# Patient Record
Sex: FEMALE | Race: BLACK OR AFRICAN AMERICAN | NOT HISPANIC OR LATINO | URBAN - METROPOLITAN AREA
[De-identification: names, ages, dates, MRNs, and addresses within clinical notes are randomized per-mention and may not be internally consistent; named-entity substitution may affect disease eponyms.]

---

## 2017-02-28 ENCOUNTER — EMERGENCY (EMERGENCY)
Facility: HOSPITAL | Age: 22
LOS: 0 days | Discharge: HOME | End: 2017-02-28
Attending: EMERGENCY MEDICINE
Payer: COMMERCIAL

## 2017-02-28 VITALS
OXYGEN SATURATION: 98 % | WEIGHT: 110.01 LBS | RESPIRATION RATE: 17 BRPM | SYSTOLIC BLOOD PRESSURE: 118 MMHG | DIASTOLIC BLOOD PRESSURE: 55 MMHG | TEMPERATURE: 98 F | HEART RATE: 97 BPM | HEIGHT: 64 IN

## 2017-02-28 DIAGNOSIS — R51 HEADACHE: ICD-10-CM

## 2017-02-28 PROCEDURE — 99053 MED SERV 10PM-8AM 24 HR FAC: CPT

## 2017-02-28 PROCEDURE — 70450 CT HEAD/BRAIN W/O DYE: CPT | Mod: 26

## 2017-02-28 PROCEDURE — 99284 EMERGENCY DEPT VISIT MOD MDM: CPT | Mod: 25

## 2017-02-28 RX ORDER — ACETAMINOPHEN 500 MG
650 TABLET ORAL ONCE
Qty: 0 | Refills: 0 | Status: COMPLETED | OUTPATIENT
Start: 2017-02-28 | End: 2017-02-28

## 2017-02-28 RX ADMIN — Medication 650 MILLIGRAM(S): at 04:43

## 2017-02-28 NOTE — ED PROVIDER NOTE - OBJECTIVE STATEMENT
21yoF; with no signif pmh; now p/w headache s/p head trauma. patient states she was in bathroom 2 days ago and sneezed hard and hit head on faucet. c/o headache since then--right frontal head, radiating posteriorly with nausea and photophobia. denies blurry vision/double vision. denies numbness/tingling. denies nausea. denies focal weakness. denies unsteady gait. denies f/c/s. denies neck stiffness.

## 2017-02-28 NOTE — ED ADULT TRIAGE NOTE - CHIEF COMPLAINT QUOTE
Patient reports "migraine headache. I hit my head 2 days ago." + photophobia. Denies noise sensitivity. + nausea, no vomiting. LMP 2 weeks ago. "my head is pounding"

## 2017-02-28 NOTE — ED ADULT NURSE NOTE - CHPI ED SYMPTOMS NEG
no blurred vision/no fever/no numbness/no loss of consciousness/no weakness/no confusion/no change in level of consciousness/no vomiting

## 2019-01-10 ENCOUNTER — EMERGENCY (EMERGENCY)
Facility: HOSPITAL | Age: 24
LOS: 1 days | Discharge: HOME | End: 2019-01-10
Attending: EMERGENCY MEDICINE
Payer: COMMERCIAL

## 2019-01-10 VITALS
SYSTOLIC BLOOD PRESSURE: 128 MMHG | TEMPERATURE: 98 F | OXYGEN SATURATION: 98 % | HEART RATE: 102 BPM | RESPIRATION RATE: 20 BRPM | DIASTOLIC BLOOD PRESSURE: 81 MMHG

## 2019-01-10 VITALS
RESPIRATION RATE: 20 BRPM | DIASTOLIC BLOOD PRESSURE: 85 MMHG | TEMPERATURE: 98 F | HEIGHT: 64 IN | SYSTOLIC BLOOD PRESSURE: 124 MMHG | OXYGEN SATURATION: 97 % | WEIGHT: 145.95 LBS | HEART RATE: 112 BPM

## 2019-01-10 LAB
FLU A RESULT: SIGNIFICANT CHANGE UP
FLU A RESULT: SIGNIFICANT CHANGE UP
FLUAV AG NPH QL: SIGNIFICANT CHANGE UP
FLUBV AG NPH QL: SIGNIFICANT CHANGE UP
RSV RESULT: DETECTED
RSV RNA RESP QL NAA+PROBE: DETECTED

## 2019-01-10 PROCEDURE — 87631 RESP VIRUS 3-5 TARGETS: CPT

## 2019-01-10 PROCEDURE — 99283 EMERGENCY DEPT VISIT LOW MDM: CPT

## 2019-01-10 PROCEDURE — 99283 EMERGENCY DEPT VISIT LOW MDM: CPT | Mod: 25

## 2019-01-10 RX ORDER — ACETAMINOPHEN 500 MG
650 TABLET ORAL ONCE
Qty: 0 | Refills: 0 | Status: COMPLETED | OUTPATIENT
Start: 2019-01-10 | End: 2019-01-10

## 2019-01-10 RX ORDER — IBUPROFEN 200 MG
600 TABLET ORAL ONCE
Qty: 0 | Refills: 0 | Status: COMPLETED | OUTPATIENT
Start: 2019-01-10 | End: 2019-01-10

## 2019-01-10 RX ADMIN — Medication 650 MILLIGRAM(S): at 01:21

## 2019-01-10 RX ADMIN — Medication 600 MILLIGRAM(S): at 01:21

## 2019-01-10 NOTE — ED PROVIDER NOTE - PROGRESS NOTE DETAILS
Pt was diagnosed with RSV, reassessed, feeling better, stable and ready to be discharged. Patient is reassessed, states feeling much better at this time. I discussed patient's results with them and explained to follow up with PMD as directed. [id number]

## 2019-01-10 NOTE — ED PROVIDER NOTE - NSFOLLOWUPINSTRUCTIONS_ED_ALL_ED_FT
Please take over the counter medications, such as Motrin or Tylenol, for fever or pain   Please return to the ER for any worsening or new symptoms.

## 2019-01-10 NOTE — ED ADULT NURSE NOTE - NSIMPLEMENTINTERV_GEN_ALL_ED
Implemented All Universal Safety Interventions:  [place] to call system. Call bell, personal items and telephone within reach. Instruct patient to call for assistance. Room bathroom lighting operational. Non-slip footwear when patient is off stretcher. Physically safe environment: no spills, clutter or unnecessary equipment. Stretcher in lowest position, wheels locked, appropriate side rails in place.

## 2019-01-10 NOTE — ED PROVIDER NOTE - OBJECTIVE STATEMENT
23 year old F with no significant PMHx or PSHx c/o non-productive cough and headache x2 days.  Positive neck pain, chills, and leg numbness. Sx worsened today, eliciting ED visit. Denies nausea, vomiting, diarrhea, sore throat, and dysuria. Pt took ibuprofen this morning, 2 500mg tablets, and only headache improved. Possible sick contacts at work. On oral contraceptives.

## 2019-01-10 NOTE — ED PROVIDER NOTE - MEDICAL DECISION MAKING DETAILS
arlene - pt 23 f w 2-3 days fever nonproductive cough body aches numbness, ha neck pain, pt no evidence of menigismus ha neck cecil mild worse complaint is body aches - all s/s improve w motrin - coworkers w viral illness, nop travel no rash -- motrin, ck rapid flu - pt taking po w no comorbids doesn't need iv hydration or bloodwork

## 2019-01-10 NOTE — ED ADULT NURSE NOTE - OBJECTIVE STATEMENT
24 YO female no significant PMH, pw generalized body aches, overall fatigue HA chills non productive cough, and CP when she coughs.  Pt denies NVD, Abdominal pain, fever, SOB, and CP at rest dysuria, and hematuraia.  pt took ibuprofin at 1100.  pt is AOx3, lung sounds are clear and equal bilat, abdomen is soft and non tender.  no edema noted, strong peripheral pulses cap refill <2.  Pt given call bell and educated to call for assistance or any worsening symptoms.

## 2019-05-07 NOTE — ED ADULT NURSE NOTE - CAS DISCH ACCOMP BY
Spoke with pt and informed of message per Natalya Koehler NP. Follow up scheduled. Pt verbalized understanding.    Family

## 2021-12-07 PROBLEM — Z00.00 ENCOUNTER FOR PREVENTIVE HEALTH EXAMINATION: Status: ACTIVE | Noted: 2021-12-07

## 2022-01-11 ENCOUNTER — TRANSCRIPTION ENCOUNTER (OUTPATIENT)
Age: 27
End: 2022-01-11

## 2022-01-11 ENCOUNTER — APPOINTMENT (OUTPATIENT)
Dept: OBGYN | Facility: CLINIC | Age: 27
End: 2022-01-11
Payer: COMMERCIAL

## 2022-01-11 VITALS
DIASTOLIC BLOOD PRESSURE: 81 MMHG | HEART RATE: 102 BPM | WEIGHT: 161 LBS | HEIGHT: 64 IN | BODY MASS INDEX: 27.49 KG/M2 | SYSTOLIC BLOOD PRESSURE: 135 MMHG

## 2022-01-11 DIAGNOSIS — D58.2 OTHER HEMOGLOBINOPATHIES: ICD-10-CM

## 2022-01-11 DIAGNOSIS — N89.8 OTHER SPECIFIED NONINFLAMMATORY DISORDERS OF VAGINA: ICD-10-CM

## 2022-01-11 PROCEDURE — 0501F PRENATAL FLOW SHEET: CPT

## 2022-01-11 RX ORDER — FOLIC ACID/MULTIVIT,IRON,MINER 0.4MG-18MG
TABLET ORAL
Refills: 0 | Status: ACTIVE | COMMUNITY

## 2022-01-22 ENCOUNTER — TRANSCRIPTION ENCOUNTER (OUTPATIENT)
Age: 27
End: 2022-01-22

## 2022-01-23 LAB
CANDIDA VAG CYTO: DETECTED
G VAGINALIS+PREV SP MTYP VAG QL MICRO: DETECTED
T VAGINALIS VAG QL WET PREP: NOT DETECTED

## 2022-01-25 ENCOUNTER — APPOINTMENT (OUTPATIENT)
Dept: ANTEPARTUM | Facility: CLINIC | Age: 27
End: 2022-01-25

## 2022-01-25 ENCOUNTER — APPOINTMENT (OUTPATIENT)
Dept: OBGYN | Facility: CLINIC | Age: 27
End: 2022-01-25
Payer: COMMERCIAL

## 2022-01-25 ENCOUNTER — ASOB RESULT (OUTPATIENT)
Age: 27
End: 2022-01-25

## 2022-01-25 ENCOUNTER — APPOINTMENT (OUTPATIENT)
Dept: ANTEPARTUM | Facility: CLINIC | Age: 27
End: 2022-01-25
Payer: COMMERCIAL

## 2022-01-25 VITALS
HEIGHT: 64 IN | WEIGHT: 161 LBS | BODY MASS INDEX: 27.49 KG/M2 | SYSTOLIC BLOOD PRESSURE: 121 MMHG | HEART RATE: 66 BPM | DIASTOLIC BLOOD PRESSURE: 79 MMHG

## 2022-01-25 DIAGNOSIS — B37.3 CANDIDIASIS OF VULVA AND VAGINA: ICD-10-CM

## 2022-01-25 DIAGNOSIS — N76.0 ACUTE VAGINITIS: ICD-10-CM

## 2022-01-25 DIAGNOSIS — B96.89 ACUTE VAGINITIS: ICD-10-CM

## 2022-01-25 PROCEDURE — 76805 OB US >/= 14 WKS SNGL FETUS: CPT

## 2022-01-25 PROCEDURE — 76818 FETAL BIOPHYS PROFILE W/NST: CPT

## 2022-01-25 PROCEDURE — 0502F SUBSEQUENT PRENATAL CARE: CPT

## 2022-01-25 PROCEDURE — 76820 UMBILICAL ARTERY ECHO: CPT

## 2022-01-25 RX ORDER — METRONIDAZOLE 7.5 MG/G
0.75 GEL VAGINAL
Qty: 1 | Refills: 0 | Status: DISCONTINUED | COMMUNITY
Start: 2022-01-23 | End: 2022-01-25

## 2022-02-02 ENCOUNTER — NON-APPOINTMENT (OUTPATIENT)
Age: 27
End: 2022-02-02

## 2022-02-02 ENCOUNTER — APPOINTMENT (OUTPATIENT)
Dept: OBGYN | Facility: CLINIC | Age: 27
End: 2022-02-02
Payer: COMMERCIAL

## 2022-02-02 VITALS
HEART RATE: 80 BPM | HEIGHT: 64 IN | SYSTOLIC BLOOD PRESSURE: 117 MMHG | DIASTOLIC BLOOD PRESSURE: 77 MMHG | WEIGHT: 161 LBS | BODY MASS INDEX: 27.49 KG/M2

## 2022-02-02 PROCEDURE — 0502F SUBSEQUENT PRENATAL CARE: CPT

## 2022-02-02 PROCEDURE — 99213 OFFICE O/P EST LOW 20 MIN: CPT | Mod: 25

## 2022-02-02 RX ORDER — TERCONAZOLE 80 MG/1
80 SUPPOSITORY VAGINAL
Qty: 1 | Refills: 0 | Status: COMPLETED | COMMUNITY
Start: 2022-01-25 | End: 2022-02-02

## 2022-02-10 ENCOUNTER — APPOINTMENT (OUTPATIENT)
Dept: OBGYN | Facility: CLINIC | Age: 27
End: 2022-02-10

## 2022-02-16 ENCOUNTER — APPOINTMENT (OUTPATIENT)
Dept: OBGYN | Facility: CLINIC | Age: 27
End: 2022-02-16

## 2022-02-17 ENCOUNTER — APPOINTMENT (OUTPATIENT)
Dept: OBGYN | Facility: CLINIC | Age: 27
End: 2022-02-17
Payer: COMMERCIAL

## 2022-02-17 VITALS — DIASTOLIC BLOOD PRESSURE: 79 MMHG | SYSTOLIC BLOOD PRESSURE: 134 MMHG

## 2022-02-17 VITALS
DIASTOLIC BLOOD PRESSURE: 81 MMHG | WEIGHT: 163 LBS | HEART RATE: 88 BPM | BODY MASS INDEX: 27.83 KG/M2 | SYSTOLIC BLOOD PRESSURE: 137 MMHG | HEIGHT: 64 IN

## 2022-02-17 PROCEDURE — 0502F SUBSEQUENT PRENATAL CARE: CPT

## 2022-02-17 RX ORDER — AMPICILLIN 500 MG/1
500 CAPSULE ORAL EVERY 8 HOURS
Qty: 21 | Refills: 0 | Status: DISCONTINUED | COMMUNITY
Start: 2022-01-27 | End: 2022-02-17

## 2022-02-17 RX ORDER — METRONIDAZOLE 7.5 MG/G
0.75 GEL VAGINAL
Qty: 1 | Refills: 0 | Status: DISCONTINUED | COMMUNITY
Start: 2022-01-25 | End: 2022-02-17

## 2022-02-17 RX ORDER — TERCONAZOLE 4 MG/G
0.4 CREAM VAGINAL
Qty: 1 | Refills: 0 | Status: DISCONTINUED | COMMUNITY
Start: 2022-02-02 | End: 2022-02-17

## 2022-02-18 ENCOUNTER — NON-APPOINTMENT (OUTPATIENT)
Age: 27
End: 2022-02-18

## 2022-02-22 ENCOUNTER — NON-APPOINTMENT (OUTPATIENT)
Age: 27
End: 2022-02-22

## 2022-02-22 ENCOUNTER — APPOINTMENT (OUTPATIENT)
Dept: OBGYN | Facility: CLINIC | Age: 27
End: 2022-02-22
Payer: COMMERCIAL

## 2022-02-22 VITALS
BODY MASS INDEX: 27.66 KG/M2 | WEIGHT: 162 LBS | HEIGHT: 64 IN | HEART RATE: 66 BPM | DIASTOLIC BLOOD PRESSURE: 70 MMHG | SYSTOLIC BLOOD PRESSURE: 122 MMHG

## 2022-02-22 LAB
BACTERIA UR CULT: NORMAL
GP B STREP DNA SPEC QL NAA+PROBE: DETECTED
GP B STREP DNA SPEC QL NAA+PROBE: NORMAL
SOURCE GBS: NORMAL

## 2022-02-22 PROCEDURE — 0502F SUBSEQUENT PRENATAL CARE: CPT

## 2022-02-24 ENCOUNTER — NON-APPOINTMENT (OUTPATIENT)
Age: 27
End: 2022-02-24

## 2022-02-27 ENCOUNTER — TRANSCRIPTION ENCOUNTER (OUTPATIENT)
Age: 27
End: 2022-02-27

## 2022-02-28 ENCOUNTER — RESULT REVIEW (OUTPATIENT)
Age: 27
End: 2022-02-28

## 2022-02-28 ENCOUNTER — EMERGENCY (EMERGENCY)
Facility: HOSPITAL | Age: 27
LOS: 1 days | Discharge: STILL ADMITTED OR ADMINISTRATIVE | End: 2022-02-28
Admitting: EMERGENCY MEDICINE
Payer: SELF-PAY

## 2022-02-28 ENCOUNTER — INPATIENT (INPATIENT)
Facility: HOSPITAL | Age: 27
LOS: 3 days | Discharge: HOME | End: 2022-03-04
Attending: OBSTETRICS & GYNECOLOGY | Admitting: OBSTETRICS & GYNECOLOGY
Payer: COMMERCIAL

## 2022-02-28 ENCOUNTER — TRANSCRIPTION ENCOUNTER (OUTPATIENT)
Age: 27
End: 2022-02-28

## 2022-02-28 VITALS
OXYGEN SATURATION: 100 % | HEART RATE: 84 BPM | HEIGHT: 61 IN | DIASTOLIC BLOOD PRESSURE: 84 MMHG | RESPIRATION RATE: 15 BRPM | SYSTOLIC BLOOD PRESSURE: 115 MMHG | TEMPERATURE: 98 F

## 2022-02-28 VITALS
RESPIRATION RATE: 16 BRPM | SYSTOLIC BLOOD PRESSURE: 124 MMHG | DIASTOLIC BLOOD PRESSURE: 75 MMHG | TEMPERATURE: 98 F | HEART RATE: 80 BPM

## 2022-02-28 DIAGNOSIS — Z3A.00 WEEKS OF GESTATION OF PREGNANCY NOT SPECIFIED: ICD-10-CM

## 2022-02-28 DIAGNOSIS — Z98.890 OTHER SPECIFIED POSTPROCEDURAL STATES: Chronic | ICD-10-CM

## 2022-02-28 DIAGNOSIS — O26.899 OTHER SPECIFIED PREGNANCY RELATED CONDITIONS, UNSPECIFIED TRIMESTER: ICD-10-CM

## 2022-02-28 LAB
BASOPHILS # BLD AUTO: 0.02 K/UL — SIGNIFICANT CHANGE UP (ref 0–0.2)
BASOPHILS NFR BLD AUTO: 0.2 % — SIGNIFICANT CHANGE UP (ref 0–2)
BLD GP AB SCN SERPL QL: NEGATIVE — SIGNIFICANT CHANGE UP
COVID-19 SPIKE DOMAIN AB INTERP: POSITIVE
COVID-19 SPIKE DOMAIN ANTIBODY RESULT: >250 U/ML — HIGH
EOSINOPHIL # BLD AUTO: 0.12 K/UL — SIGNIFICANT CHANGE UP (ref 0–0.5)
EOSINOPHIL NFR BLD AUTO: 1 % — SIGNIFICANT CHANGE UP (ref 0–6)
HCT VFR BLD CALC: 38.3 % — SIGNIFICANT CHANGE UP (ref 34.5–45)
HGB BLD-MCNC: 13.7 G/DL — SIGNIFICANT CHANGE UP (ref 11.5–15.5)
IANC: 7.1 K/UL — SIGNIFICANT CHANGE UP (ref 1.5–8.5)
IMM GRANULOCYTES NFR BLD AUTO: 0.4 % — SIGNIFICANT CHANGE UP (ref 0–1.5)
LYMPHOCYTES # BLD AUTO: 29.7 % — SIGNIFICANT CHANGE UP (ref 13–44)
LYMPHOCYTES # BLD AUTO: 3.47 K/UL — HIGH (ref 1–3.3)
MCHC RBC-ENTMCNC: 29.5 PG — SIGNIFICANT CHANGE UP (ref 27–34)
MCHC RBC-ENTMCNC: 35.8 GM/DL — SIGNIFICANT CHANGE UP (ref 32–36)
MCV RBC AUTO: 82.4 FL — SIGNIFICANT CHANGE UP (ref 80–100)
MONOCYTES # BLD AUTO: 0.92 K/UL — HIGH (ref 0–0.9)
MONOCYTES NFR BLD AUTO: 7.9 % — SIGNIFICANT CHANGE UP (ref 2–14)
NEUTROPHILS # BLD AUTO: 7.1 K/UL — SIGNIFICANT CHANGE UP (ref 1.8–7.4)
NEUTROPHILS NFR BLD AUTO: 60.8 % — SIGNIFICANT CHANGE UP (ref 43–77)
NRBC # BLD: 0 /100 WBCS — SIGNIFICANT CHANGE UP
NRBC # FLD: 0 K/UL — SIGNIFICANT CHANGE UP
PLATELET # BLD AUTO: 224 K/UL — SIGNIFICANT CHANGE UP (ref 150–400)
RBC # BLD: 4.65 M/UL — SIGNIFICANT CHANGE UP (ref 3.8–5.2)
RBC # FLD: 14.6 % — HIGH (ref 10.3–14.5)
RH IG SCN BLD-IMP: POSITIVE — SIGNIFICANT CHANGE UP
RH IG SCN BLD-IMP: POSITIVE — SIGNIFICANT CHANGE UP
SARS-COV-2 IGG+IGM SERPL QL IA: >250 U/ML — HIGH
SARS-COV-2 IGG+IGM SERPL QL IA: POSITIVE
SARS-COV-2 RNA SPEC QL NAA+PROBE: SIGNIFICANT CHANGE UP
T PALLIDUM AB TITR SER: NEGATIVE — SIGNIFICANT CHANGE UP
WBC # BLD: 11.68 K/UL — HIGH (ref 3.8–10.5)
WBC # FLD AUTO: 11.68 K/UL — HIGH (ref 3.8–10.5)

## 2022-02-28 PROCEDURE — 88307 TISSUE EXAM BY PATHOLOGIST: CPT | Mod: 26

## 2022-02-28 PROCEDURE — L9996: CPT

## 2022-02-28 PROCEDURE — 59515 CESAREAN DELIVERY: CPT | Mod: U9,UB,GC

## 2022-02-28 RX ORDER — OXYTOCIN 10 UNIT/ML
390 VIAL (ML) INJECTION
Qty: 20 | Refills: 0 | Status: DISCONTINUED | OUTPATIENT
Start: 2022-02-28 | End: 2022-03-01

## 2022-02-28 RX ORDER — CITRIC ACID/SODIUM CITRATE 300-500 MG
30 SOLUTION, ORAL ORAL ONCE
Refills: 0 | Status: DISCONTINUED | OUTPATIENT
Start: 2022-02-28 | End: 2022-02-28

## 2022-02-28 RX ORDER — IBUPROFEN 200 MG
1 TABLET ORAL
Qty: 0 | Refills: 0 | DISCHARGE
Start: 2022-02-28

## 2022-02-28 RX ORDER — MEDROXYPROGESTERONE ACETATE 150 MG/ML
0 INJECTION, SUSPENSION, EXTENDED RELEASE INTRAMUSCULAR
Qty: 0 | Refills: 0 | DISCHARGE

## 2022-02-28 RX ORDER — FAMOTIDINE 10 MG/ML
20 INJECTION INTRAVENOUS ONCE
Refills: 0 | Status: DISCONTINUED | OUTPATIENT
Start: 2022-02-28 | End: 2022-02-28

## 2022-02-28 RX ORDER — OXYCODONE HYDROCHLORIDE 5 MG/1
5 TABLET ORAL
Refills: 0 | Status: COMPLETED | OUTPATIENT
Start: 2022-02-28 | End: 2022-03-07

## 2022-02-28 RX ORDER — INFLUENZA VIRUS VACCINE 15; 15; 15; 15 UG/.5ML; UG/.5ML; UG/.5ML; UG/.5ML
0.5 SUSPENSION INTRAMUSCULAR ONCE
Refills: 0 | Status: COMPLETED | OUTPATIENT
Start: 2022-02-28 | End: 2022-02-28

## 2022-02-28 RX ORDER — SODIUM CHLORIDE 9 MG/ML
1000 INJECTION, SOLUTION INTRAVENOUS
Refills: 0 | Status: DISCONTINUED | OUTPATIENT
Start: 2022-02-28 | End: 2022-02-28

## 2022-02-28 RX ORDER — ACETAMINOPHEN 500 MG
975 TABLET ORAL
Refills: 0 | Status: DISCONTINUED | OUTPATIENT
Start: 2022-02-28 | End: 2022-03-04

## 2022-02-28 RX ORDER — ACETAMINOPHEN 500 MG
1000 TABLET ORAL ONCE
Refills: 0 | Status: COMPLETED | OUTPATIENT
Start: 2022-02-28 | End: 2022-02-28

## 2022-02-28 RX ORDER — OXYTOCIN 10 UNIT/ML
VIAL (ML) INJECTION
Qty: 30 | Refills: 0 | Status: DISCONTINUED | OUTPATIENT
Start: 2022-02-28 | End: 2022-02-28

## 2022-02-28 RX ORDER — OXYCODONE HYDROCHLORIDE 5 MG/1
5 TABLET ORAL ONCE
Refills: 0 | Status: DISCONTINUED | OUTPATIENT
Start: 2022-02-28 | End: 2022-03-04

## 2022-02-28 RX ORDER — BENZOYL PEROXIDE MICRONIZED 5.8 %
1 TOWELETTE (EA) TOPICAL
Qty: 0 | Refills: 0 | DISCHARGE

## 2022-02-28 RX ORDER — KETOROLAC TROMETHAMINE 30 MG/ML
30 SYRINGE (ML) INJECTION EVERY 6 HOURS
Refills: 0 | Status: DISCONTINUED | OUTPATIENT
Start: 2022-02-28 | End: 2022-03-01

## 2022-02-28 RX ORDER — OXYCODONE HYDROCHLORIDE 5 MG/1
5 TABLET ORAL ONCE
Refills: 0 | Status: DISCONTINUED | OUTPATIENT
Start: 2022-02-28 | End: 2022-02-28

## 2022-02-28 RX ORDER — MAGNESIUM HYDROXIDE 400 MG/1
30 TABLET, CHEWABLE ORAL
Refills: 0 | Status: DISCONTINUED | OUTPATIENT
Start: 2022-02-28 | End: 2022-03-04

## 2022-02-28 RX ORDER — AMPICILLIN TRIHYDRATE 250 MG
1 CAPSULE ORAL EVERY 4 HOURS
Refills: 0 | Status: DISCONTINUED | OUTPATIENT
Start: 2022-02-28 | End: 2022-02-28

## 2022-02-28 RX ORDER — AMPICILLIN TRIHYDRATE 250 MG
2 CAPSULE ORAL ONCE
Refills: 0 | Status: COMPLETED | OUTPATIENT
Start: 2022-02-28 | End: 2022-02-28

## 2022-02-28 RX ORDER — OXYTOCIN 10 UNIT/ML
41.67 VIAL (ML) INJECTION
Qty: 20 | Refills: 0 | Status: DISCONTINUED | OUTPATIENT
Start: 2022-02-28 | End: 2022-03-01

## 2022-02-28 RX ORDER — TETANUS TOXOID, REDUCED DIPHTHERIA TOXOID AND ACELLULAR PERTUSSIS VACCINE, ADSORBED 5; 2.5; 8; 8; 2.5 [IU]/.5ML; [IU]/.5ML; UG/.5ML; UG/.5ML; UG/.5ML
0.5 SUSPENSION INTRAMUSCULAR ONCE
Refills: 0 | Status: DISCONTINUED | OUTPATIENT
Start: 2022-02-28 | End: 2022-03-04

## 2022-02-28 RX ORDER — NALBUPHINE HYDROCHLORIDE 10 MG/ML
2.5 INJECTION, SOLUTION INTRAMUSCULAR; INTRAVENOUS; SUBCUTANEOUS EVERY 6 HOURS
Refills: 0 | Status: DISCONTINUED | OUTPATIENT
Start: 2022-02-28 | End: 2022-03-04

## 2022-02-28 RX ORDER — IBUPROFEN 200 MG
600 TABLET ORAL EVERY 6 HOURS
Refills: 0 | Status: COMPLETED | OUTPATIENT
Start: 2022-02-28 | End: 2023-01-27

## 2022-02-28 RX ORDER — OXYTOCIN 10 UNIT/ML
333.33 VIAL (ML) INJECTION
Qty: 20 | Refills: 0 | Status: DISCONTINUED | OUTPATIENT
Start: 2022-02-28 | End: 2022-03-01

## 2022-02-28 RX ORDER — SODIUM CHLORIDE 9 MG/ML
1000 INJECTION, SOLUTION INTRAVENOUS
Refills: 0 | Status: DISCONTINUED | OUTPATIENT
Start: 2022-02-28 | End: 2022-03-01

## 2022-02-28 RX ORDER — DIPHENHYDRAMINE HCL 50 MG
25 CAPSULE ORAL EVERY 6 HOURS
Refills: 0 | Status: COMPLETED | OUTPATIENT
Start: 2022-02-28 | End: 2023-01-27

## 2022-02-28 RX ORDER — DIPHENHYDRAMINE HCL 50 MG
25 CAPSULE ORAL EVERY 6 HOURS
Refills: 0 | Status: DISCONTINUED | OUTPATIENT
Start: 2022-02-28 | End: 2022-03-04

## 2022-02-28 RX ORDER — SIMETHICONE 80 MG/1
80 TABLET, CHEWABLE ORAL EVERY 4 HOURS
Refills: 0 | Status: DISCONTINUED | OUTPATIENT
Start: 2022-02-28 | End: 2022-03-04

## 2022-02-28 RX ORDER — LANOLIN
1 OINTMENT (GRAM) TOPICAL EVERY 6 HOURS
Refills: 0 | Status: DISCONTINUED | OUTPATIENT
Start: 2022-02-28 | End: 2022-03-04

## 2022-02-28 RX ORDER — HEPARIN SODIUM 5000 [USP'U]/ML
5000 INJECTION INTRAVENOUS; SUBCUTANEOUS EVERY 12 HOURS
Refills: 0 | Status: DISCONTINUED | OUTPATIENT
Start: 2022-02-28 | End: 2022-03-04

## 2022-02-28 RX ADMIN — Medication 108 GRAM(S): at 09:07

## 2022-02-28 RX ADMIN — Medication 30 MILLIGRAM(S): at 17:44

## 2022-02-28 RX ADMIN — FAMOTIDINE 20 MILLIGRAM(S): 10 INJECTION INTRAVENOUS at 09:19

## 2022-02-28 RX ADMIN — Medication 30 MILLIGRAM(S): at 17:29

## 2022-02-28 RX ADMIN — OXYCODONE HYDROCHLORIDE 5 MILLIGRAM(S): 5 TABLET ORAL at 13:26

## 2022-02-28 RX ADMIN — Medication 25 MILLIGRAM(S): at 20:10

## 2022-02-28 RX ADMIN — Medication 30 MILLILITER(S): at 09:19

## 2022-02-28 RX ADMIN — SODIUM CHLORIDE 125 MILLILITER(S): 9 INJECTION, SOLUTION INTRAVENOUS at 05:00

## 2022-02-28 RX ADMIN — OXYCODONE HYDROCHLORIDE 5 MILLIGRAM(S): 5 TABLET ORAL at 13:46

## 2022-02-28 RX ADMIN — SODIUM CHLORIDE 75 MILLILITER(S): 9 INJECTION, SOLUTION INTRAVENOUS at 11:05

## 2022-02-28 RX ADMIN — Medication 400 MILLIGRAM(S): at 13:24

## 2022-02-28 RX ADMIN — Medication 1000 MILLIUNIT(S)/MIN: at 11:00

## 2022-02-28 RX ADMIN — Medication 216 GRAM(S): at 05:04

## 2022-02-28 RX ADMIN — HEPARIN SODIUM 5000 UNIT(S): 5000 INJECTION INTRAVENOUS; SUBCUTANEOUS at 17:29

## 2022-02-28 RX ADMIN — Medication 2 MILLIUNIT(S)/MIN: at 06:55

## 2022-02-28 RX ADMIN — Medication 1000 MILLIGRAM(S): at 13:40

## 2022-02-28 NOTE — OB PROVIDER LABOR PROGRESS NOTE - NS_SUBJECTIVE/OBJECTIVE_OBGYN_ALL_OB_FT
P0 at 39w5d admitted for labor at 3cm. EFW 13% 22.   Pitocin started at 7am and stopped at 815am due to recurrent late decelerations.  VE 5cm at that time, amniotomy performed with clear fluid.  FHT responded to scalp stimulation, however, minimal variability noted otherwise.  FHT reviewed during tracing rounds, MFM recommended  as patient is remote from delivery.  R/b/a discussed with patient at bedside, including but not limited to bleeding, infection and increased hospital stay. Patient voices understanding, all questions answered to her satisfaction. Consent signed and witnessed.  Charge RN and anesthesia made aware.    Ziggy Mckeon MD
Pt received epidural. Blood pressure drop to 63/33 with subsequent deceleration.
pt seen and examined at bedside for late decel x2
pt seen and examined at bedside for AROM

## 2022-02-28 NOTE — OB PROVIDER LABOR PROGRESS NOTE - ASSESSMENT
Pt received ephedrine x1 with improvement of BP to 150/71 on retake.   Pt receiving 1L bolus   Active repositioning, deceleration resolved in hands/ knees position     Pt to start Pitocin when tracing resumes cat 1 for 30min.   Pt seen and examined with Dr. Robert   Discussed with Dr. Gala Robles, PGY-1

## 2022-02-28 NOTE — OB PROVIDER TRIAGE NOTE - NSOBPROVIDERNOTE_OBGYN_ALL_OB_FT
pt in labor  d/w MD Cedeño  pt admitted for labor  pt approved for epidural placement by MD Cedeño  routine lab orders  consents obtained  COVID PCR sent pt in labor  d/w MD Cedeño  pt admitted for labor  pt approved for epidural placement by MD Cedeño  routine lab orders  Ampicillin for GBS prophylaxis  consents obtained  COVID PCR sent

## 2022-02-28 NOTE — OB RN INTRAOPERATIVE NOTE - NSSELHIDDEN_OBGYN_ALL_OB_FT
[NS_DeliveryAttending1_OBGYN_ALL_OB_FT:[id number]=],[NS_DeliveryAssist1_OBGYN_ALL_OB_FT:[id number]=],[NS_DeliveryRN_OBGYN_ALL_OB_FT:[id number]==]

## 2022-02-28 NOTE — ED ADULT TRIAGE NOTE - CHIEF COMPLAINT QUOTE
alert 39 weeks pregnant having contractions  1st baby water did not break  seen by Dr Myesha whipple for transfer to [facility]   spoke to [facility] and sent to [facility]

## 2022-02-28 NOTE — CHART NOTE - NSCHARTNOTEFT_GEN_A_CORE
service attending    pt comfortable  ve 5/90/-1  fht 140 min variability with late decels  toco irreg in triplets  a/p cat 2 fht  lateral position  pitocin just off  head stimulation  will discuss arom with dr julieta Rueda MD

## 2022-02-28 NOTE — OB PROVIDER DELIVERY SUMMARY - NSSELHIDDEN_OBGYN_ALL_OB_FT
[NS_DeliveryAttending1_OBGYN_ALL_OB_FT:[email or web address]=],[NS_DeliveryAssist1_OBGYN_ALL_OB_FT:[id number]=],[NS_DeliveryRN_OBGYN_ALL_OB_FT:[id number]==]

## 2022-02-28 NOTE — OB RN DELIVERY SUMMARY - NS_SEPSISRSKCALC_OBGYN_ALL_OB_FT
EOS calculated successfully. EOS Risk Factor: 0.5/1000 live births ([facility] national incidence); GA=39w5d; Temp=98.42; ROM=1.067; GBS='Positive'; Antibiotics='GBS specific antibiotics > 2 hrs prior to birth'

## 2022-02-28 NOTE — OB PROVIDER H&P - PROBLEM SELECTOR PLAN 1
pt in labor  d/w MD Cedeño  pt admitted for labor  pt approved for epidural placement by MD Cedeño  routine lab orders  Ampicillin for GBS prophylaxis  consents obtained  COVID PCR sent

## 2022-02-28 NOTE — OB RN DELIVERY SUMMARY - NSSELHIDDEN_OBGYN_ALL_OB_FT
[NS_DeliveryAttending1_OBGYN_ALL_OB_FT:[email or web address]=],[NS_DeliveryAssist1_OBGYN_ALL_OB_FT:[id number]=],[NS_DeliveryRN_OBGYN_ALL_OB_FT:[id number]==]

## 2022-02-28 NOTE — DISCHARGE NOTE OB - CARE PROVIDER_API CALL
Tawanna Eugene (MD)  Obstetrics and Gynecology  [facility] [place] [address]  Phone: [phone]  Fax: [phone]  Follow Up Time:

## 2022-02-28 NOTE — OB PROVIDER TRIAGE NOTE - HISTORY OF PRESENT ILLNESS
27 y/o  at 39.5 weeks gestation c/o ctx q 5-7 mins, pain 10 out of 10 on pain scale. Denies lof, vb, back pain. Reports good FM. Pt is COVID vaccinated.    AP course complicated by GBS +, and BV infection in January and February (pt finished prescribed Terconazole for yeast infection last night). Pt also states "I have high blood pressure just during the pregnancy"  NKDA  Current medications:  -Iron  -PNV  OBGYN history:  -2 TOP w/ 1 D&C  Denies medical history  Surgical history:  -D&C  Denies psych history  Denies smoking, alcohol, and illicit drug use 27 y/o  at 39.5 weeks gestation c/o ctx q 5-7 mins, pain 10 out of 10 on pain scale. Denies lof, vb, back pain. Reports good FM. Pt is COVID vaccinated.    Pt was a late transfer of care @ 33 wks from [facility]. Last M sonogram  states baby measuring in the 13th percentile.    AP course complicated by GBS +, and BV infection in January and February (pt finished prescribed Terazol for yeast infection last night)  NKDA  Current medications:  -Iron  -PNV  OBGYN history:  -2 TOP w/ 1 D&C  Medical history:  -sickle cell trait - FOB tested ?negative  Surgical history:  -D&C  Denies psych history  Denies smoking, alcohol, and illicit drug use

## 2022-02-28 NOTE — OB PROVIDER IHI INDUCTION/AUGMENTATION NOTE - NS_EFW_OBGYN_ALL_OB_FT
4122 - On foscarnet for resistant HSV infection ; confirmed HSV 2, sensitivities and final pathology pending   - GC/chl, syphillis screen negative  - NS 500mL bolus q12 while on foscarnet, monitor CMP. 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 [facility] consulted over the weekend- they will see the patient on Monday

## 2022-02-28 NOTE — OB RN TRIAGE NOTE - NSICDXPASTSURGICALHX_GEN_ALL_CORE_FT
PAST SURGICAL HISTORY:  No significant past surgical history      PAST SURGICAL HISTORY:  H/O dilation and curettage

## 2022-02-28 NOTE — DISCHARGE NOTE OB - NS MD DC FALL RISK RISK
For information on Fall & Injury Prevention, visit: [email or web address] OR  [email or web address] OR  https://www.cdc.gov/steadi/patient.html

## 2022-02-28 NOTE — OB RN TRIAGE NOTE - FALL HARM RISK - UNIVERSAL INTERVENTIONS
Bed in lowest position, wheels locked, appropriate side rails in place/Call bell, personal items and telephone in reach/Instruct patient to call for assistance before getting out of bed or chair/Non-slip footwear when patient is out of bed/[place] to call system/Physically safe environment - no spills, clutter or unnecessary equipment/Purposeful Proactive Rounding/Room/bathroom lighting operational, light cord in reach

## 2022-02-28 NOTE — OB PROVIDER DELIVERY SUMMARY - NSPROVIDERDELIVERYNOTE_OBGYN_ALL_OB_FT
Pfannenstiel skin incision made. Hysterotomy made in low transverse fashion. LIve female infant delivered in cephalic presentation at 9:46a. Mouth and nose suctioned with a bulb. Delayed cord clamping performed. Infant handed off to the pediatricians, BW 2360 grams. Placenta delivered intact without any difficulty. Uterus exteriorized. Uterus closed with 1 caprosyn in running, locked fashion in 2 layers. Uterus, fallopian tubes and ovaries returned to the abdominal cavity. Excess fluid and clots were cleared with laparotomy sponge. Peritoneum closed with 2-0 chromic suture. Fascia closed with 0 vicryl in continuous fashion. Subcutaneous layer closed with 2-0 plain gut. Skin closed with 3-0 monocryl in subcuticular fashion. All instrument/lap counts correct x2. Patient taken to the recovery room in stable condition.

## 2022-02-28 NOTE — OB PROVIDER H&P - NSHPPHYSICALEXAM_GEN_ALL_CORE
A&O x3  FHT: category 1 tracing  TOCO: moderate irregular contractions  abdomen: gravid, soft, nontender  SVE: 3/80/-3  EFW: 2495 grams  TAS: cephalic presentation  GBS + 2/17/22    Vital Signs Last 24 Hrs  T(C): 36.9 (28 Feb 2022 03:03), Max: 36.9 (28 Feb 2022 02:56)  T(F): 98.42 (28 Feb 2022 03:03), Max: 98.42 (28 Feb 2022 03:03)  HR: 80 (28 Feb 2022 03:03) (80 - 84)  BP: 124/75 (28 Feb 2022 03:03) (115/84 - 124/75)  BP(mean): --  RR: 16 (28 Feb 2022 02:56) (15 - 16)  SpO2: 100% (28 Feb 2022 02:21) (100% - 100%)

## 2022-02-28 NOTE — OB PROVIDER LABOR PROGRESS NOTE - ASSESSMENT
27 y/o  @ 39/5 weeks on pitocin  pt resuscitated via positional changes. FHR returned to baseline    continue pitocin  continue to monitor  27 y/o  @ 39/5 weeks on pitocin  pt resuscitated via positional changes. FHR returned to baseline    hold pitocin until tracing improves   continue to monitor   d/w P. Uppalapati [id number]

## 2022-02-28 NOTE — OB PROVIDER TRIAGE NOTE - NSHPPHYSICALEXAM_GEN_ALL_CORE
A&O x3  FHT: category 1 tracing  TOCO: moderate irregular contractions  abdomen: gravid, soft, nontender  SVE: 3/80/-3  EFW: 2722 grams  TAS: cephalic presentation  GBS + 2/17/22    Vital Signs Last 24 Hrs  T(C): 36.9 (28 Feb 2022 03:03), Max: 36.9 (28 Feb 2022 02:56)  T(F): 98.42 (28 Feb 2022 03:03), Max: 98.42 (28 Feb 2022 03:03)  HR: 80 (28 Feb 2022 03:03) (80 - 84)  BP: 124/75 (28 Feb 2022 03:03) (115/84 - 124/75)  BP(mean): --  RR: 16 (28 Feb 2022 02:56) (15 - 16)  SpO2: 100% (28 Feb 2022 02:21) (100% - 100%) A&O x3  FHT: category 1 tracing  TOCO: moderate irregular contractions  abdomen: gravid, soft, nontender  SVE: 3/80/-3  EFW: 2495 grams  TAS: cephalic presentation  GBS + 2/17/22    Vital Signs Last 24 Hrs  T(C): 36.9 (28 Feb 2022 03:03), Max: 36.9 (28 Feb 2022 02:56)  T(F): 98.42 (28 Feb 2022 03:03), Max: 98.42 (28 Feb 2022 03:03)  HR: 80 (28 Feb 2022 03:03) (80 - 84)  BP: 124/75 (28 Feb 2022 03:03) (115/84 - 124/75)  BP(mean): --  RR: 16 (28 Feb 2022 02:56) (15 - 16)  SpO2: 100% (28 Feb 2022 02:21) (100% - 100%)

## 2022-02-28 NOTE — OB PROVIDER LABOR PROGRESS NOTE - ASSESSMENT
25 y/o  @ 39/5 weeks  AROM at 8:45a. clear fluid   pt tolerated well  Dr Lopez at bedside   DR Asher aware 25 y/o  @ 39/5 weeks  AROM at 8:45a. clear fluid   pt tolerated well  Dr Lopez at bedside     Service attending    agree with above  AROM clear fluid   IVH   will discuss plan with Dr. Linda Rueda MD

## 2022-02-28 NOTE — DISCHARGE NOTE OB - MEDICATION SUMMARY - MEDICATIONS TO TAKE
I will START or STAY ON the medications listed below when I get home from the hospital:    ibuprofen 600 mg oral tablet  -- 1 tab(s) by mouth every 6 hours  -- Indication: For Pain   I will START or STAY ON the medications listed below when I get home from the hospital:    ibuprofen 600 mg oral tablet  -- 1 tab(s) by mouth every 6 hours, As Needed  -- Indication: For pain    acetaminophen 325 mg oral tablet  -- 3 tab(s) by mouth every 6 hours, As Needed  -- Indication: For pain

## 2022-02-28 NOTE — DISCHARGE NOTE OB - PATIENT PORTAL LINK FT
You can access the FollowMyHealth Patient Portal offered by [facility] by registering at the following website: [email or web address]. By joining [facility]’s FollowMyHealth portal, you will also be able to view your health information using other applications (apps) compatible with our system.

## 2022-02-28 NOTE — DISCHARGE NOTE OB - HOSPITAL COURSE
Patient admitted in labor. She had a  section for abnormal fetal heart rate tracing. Viable female infant. Postpartum course uncomplicated.

## 2022-03-01 ENCOUNTER — APPOINTMENT (OUTPATIENT)
Dept: OBGYN | Facility: CLINIC | Age: 27
End: 2022-03-01

## 2022-03-01 LAB
HCT VFR BLD CALC: 30.1 % — LOW (ref 34.5–45)
HGB BLD-MCNC: 10.8 G/DL — LOW (ref 11.5–15.5)
MCHC RBC-ENTMCNC: 29.5 PG — SIGNIFICANT CHANGE UP (ref 27–34)
MCHC RBC-ENTMCNC: 35.9 GM/DL — SIGNIFICANT CHANGE UP (ref 32–36)
MCV RBC AUTO: 82.2 FL — SIGNIFICANT CHANGE UP (ref 80–100)
NRBC # BLD: 0 /100 WBCS — SIGNIFICANT CHANGE UP
NRBC # FLD: 0 K/UL — SIGNIFICANT CHANGE UP
PLATELET # BLD AUTO: 186 K/UL — SIGNIFICANT CHANGE UP (ref 150–400)
RBC # BLD: 3.66 M/UL — LOW (ref 3.8–5.2)
RBC # FLD: 15 % — HIGH (ref 10.3–14.5)
WBC # BLD: 17.25 K/UL — HIGH (ref 3.8–10.5)
WBC # FLD AUTO: 17.25 K/UL — HIGH (ref 3.8–10.5)

## 2022-03-01 RX ORDER — IBUPROFEN 200 MG
600 TABLET ORAL EVERY 6 HOURS
Refills: 0 | Status: DISCONTINUED | OUTPATIENT
Start: 2022-03-01 | End: 2022-03-04

## 2022-03-01 RX ORDER — OXYCODONE HYDROCHLORIDE 5 MG/1
5 TABLET ORAL ONCE
Refills: 0 | Status: DISCONTINUED | OUTPATIENT
Start: 2022-03-01 | End: 2022-03-03

## 2022-03-01 RX ORDER — SENNA PLUS 8.6 MG/1
1 TABLET ORAL
Refills: 0 | Status: DISCONTINUED | OUTPATIENT
Start: 2022-03-01 | End: 2022-03-04

## 2022-03-01 RX ORDER — FERROUS SULFATE 325(65) MG
325 TABLET ORAL DAILY
Refills: 0 | Status: DISCONTINUED | OUTPATIENT
Start: 2022-03-01 | End: 2022-03-04

## 2022-03-01 RX ORDER — OXYCODONE HYDROCHLORIDE 5 MG/1
5 TABLET ORAL
Refills: 0 | Status: DISCONTINUED | OUTPATIENT
Start: 2022-03-01 | End: 2022-03-04

## 2022-03-01 RX ADMIN — Medication 975 MILLIGRAM(S): at 17:23

## 2022-03-01 RX ADMIN — NALBUPHINE HYDROCHLORIDE 2.5 MILLIGRAM(S): 10 INJECTION, SOLUTION INTRAMUSCULAR; INTRAVENOUS; SUBCUTANEOUS at 01:20

## 2022-03-01 RX ADMIN — Medication 975 MILLIGRAM(S): at 01:20

## 2022-03-01 RX ADMIN — HEPARIN SODIUM 5000 UNIT(S): 5000 INJECTION INTRAVENOUS; SUBCUTANEOUS at 05:20

## 2022-03-01 RX ADMIN — Medication 600 MILLIGRAM(S): at 20:36

## 2022-03-01 RX ADMIN — HEPARIN SODIUM 5000 UNIT(S): 5000 INJECTION INTRAVENOUS; SUBCUTANEOUS at 17:23

## 2022-03-01 RX ADMIN — Medication 975 MILLIGRAM(S): at 06:15

## 2022-03-01 RX ADMIN — Medication 975 MILLIGRAM(S): at 00:22

## 2022-03-01 RX ADMIN — Medication 600 MILLIGRAM(S): at 21:36

## 2022-03-01 RX ADMIN — Medication 30 MILLIGRAM(S): at 09:04

## 2022-03-01 RX ADMIN — NALBUPHINE HYDROCHLORIDE 2.5 MILLIGRAM(S): 10 INJECTION, SOLUTION INTRAMUSCULAR; INTRAVENOUS; SUBCUTANEOUS at 00:22

## 2022-03-01 RX ADMIN — Medication 975 MILLIGRAM(S): at 12:57

## 2022-03-01 RX ADMIN — OXYCODONE HYDROCHLORIDE 5 MILLIGRAM(S): 5 TABLET ORAL at 23:59

## 2022-03-01 RX ADMIN — Medication 975 MILLIGRAM(S): at 05:19

## 2022-03-01 RX ADMIN — Medication 30 MILLIGRAM(S): at 10:01

## 2022-03-01 RX ADMIN — Medication 30 MILLIGRAM(S): at 12:57

## 2022-03-01 RX ADMIN — Medication 975 MILLIGRAM(S): at 18:16

## 2022-03-01 RX ADMIN — NALBUPHINE HYDROCHLORIDE 2.5 MILLIGRAM(S): 10 INJECTION, SOLUTION INTRAMUSCULAR; INTRAVENOUS; SUBCUTANEOUS at 09:04

## 2022-03-01 RX ADMIN — Medication 30 MILLIGRAM(S): at 01:34

## 2022-03-01 RX ADMIN — OXYCODONE HYDROCHLORIDE 5 MILLIGRAM(S): 5 TABLET ORAL at 22:59

## 2022-03-01 RX ADMIN — Medication 30 MILLIGRAM(S): at 02:10

## 2022-03-02 RX ORDER — ACETAMINOPHEN 500 MG
3 TABLET ORAL
Qty: 0 | Refills: 0 | DISCHARGE
Start: 2022-03-02

## 2022-03-02 RX ADMIN — OXYCODONE HYDROCHLORIDE 5 MILLIGRAM(S): 5 TABLET ORAL at 04:11

## 2022-03-02 RX ADMIN — Medication 600 MILLIGRAM(S): at 04:11

## 2022-03-02 RX ADMIN — OXYCODONE HYDROCHLORIDE 5 MILLIGRAM(S): 5 TABLET ORAL at 18:00

## 2022-03-02 RX ADMIN — Medication 600 MILLIGRAM(S): at 17:06

## 2022-03-02 RX ADMIN — Medication 600 MILLIGRAM(S): at 10:54

## 2022-03-02 RX ADMIN — OXYCODONE HYDROCHLORIDE 5 MILLIGRAM(S): 5 TABLET ORAL at 03:11

## 2022-03-02 RX ADMIN — HEPARIN SODIUM 5000 UNIT(S): 5000 INJECTION INTRAVENOUS; SUBCUTANEOUS at 17:05

## 2022-03-02 RX ADMIN — Medication 975 MILLIGRAM(S): at 07:17

## 2022-03-02 RX ADMIN — Medication 600 MILLIGRAM(S): at 03:11

## 2022-03-02 RX ADMIN — SENNA PLUS 1 TABLET(S): 8.6 TABLET ORAL at 06:06

## 2022-03-02 RX ADMIN — OXYCODONE HYDROCHLORIDE 5 MILLIGRAM(S): 5 TABLET ORAL at 17:06

## 2022-03-02 RX ADMIN — HEPARIN SODIUM 5000 UNIT(S): 5000 INJECTION INTRAVENOUS; SUBCUTANEOUS at 06:08

## 2022-03-02 RX ADMIN — SIMETHICONE 80 MILLIGRAM(S): 80 TABLET, CHEWABLE ORAL at 06:06

## 2022-03-02 RX ADMIN — Medication 975 MILLIGRAM(S): at 06:05

## 2022-03-02 RX ADMIN — Medication 600 MILLIGRAM(S): at 11:40

## 2022-03-02 RX ADMIN — Medication 600 MILLIGRAM(S): at 18:00

## 2022-03-03 ENCOUNTER — NON-APPOINTMENT (OUTPATIENT)
Age: 27
End: 2022-03-03

## 2022-03-03 PROBLEM — D57.3 SICKLE-CELL TRAIT: Chronic | Status: ACTIVE | Noted: 2022-02-28

## 2022-03-03 RX ADMIN — SENNA PLUS 1 TABLET(S): 8.6 TABLET ORAL at 20:19

## 2022-03-03 RX ADMIN — SENNA PLUS 1 TABLET(S): 8.6 TABLET ORAL at 03:40

## 2022-03-03 RX ADMIN — Medication 975 MILLIGRAM(S): at 09:50

## 2022-03-03 RX ADMIN — Medication 600 MILLIGRAM(S): at 01:00

## 2022-03-03 RX ADMIN — Medication 975 MILLIGRAM(S): at 02:12

## 2022-03-03 RX ADMIN — OXYCODONE HYDROCHLORIDE 5 MILLIGRAM(S): 5 TABLET ORAL at 08:51

## 2022-03-03 RX ADMIN — Medication 975 MILLIGRAM(S): at 16:40

## 2022-03-03 RX ADMIN — Medication 600 MILLIGRAM(S): at 05:54

## 2022-03-03 RX ADMIN — OXYCODONE HYDROCHLORIDE 5 MILLIGRAM(S): 5 TABLET ORAL at 03:00

## 2022-03-03 RX ADMIN — HEPARIN SODIUM 5000 UNIT(S): 5000 INJECTION INTRAVENOUS; SUBCUTANEOUS at 05:54

## 2022-03-03 RX ADMIN — Medication 975 MILLIGRAM(S): at 08:51

## 2022-03-03 RX ADMIN — Medication 975 MILLIGRAM(S): at 20:19

## 2022-03-03 RX ADMIN — SIMETHICONE 80 MILLIGRAM(S): 80 TABLET, CHEWABLE ORAL at 05:55

## 2022-03-03 RX ADMIN — Medication 600 MILLIGRAM(S): at 18:52

## 2022-03-03 RX ADMIN — SIMETHICONE 80 MILLIGRAM(S): 80 TABLET, CHEWABLE ORAL at 00:11

## 2022-03-03 RX ADMIN — Medication 600 MILLIGRAM(S): at 23:52

## 2022-03-03 RX ADMIN — OXYCODONE HYDROCHLORIDE 5 MILLIGRAM(S): 5 TABLET ORAL at 09:50

## 2022-03-03 RX ADMIN — HEPARIN SODIUM 5000 UNIT(S): 5000 INJECTION INTRAVENOUS; SUBCUTANEOUS at 17:30

## 2022-03-03 RX ADMIN — OXYCODONE HYDROCHLORIDE 5 MILLIGRAM(S): 5 TABLET ORAL at 02:13

## 2022-03-03 RX ADMIN — Medication 600 MILLIGRAM(S): at 06:24

## 2022-03-03 RX ADMIN — Medication 600 MILLIGRAM(S): at 17:30

## 2022-03-03 RX ADMIN — Medication 975 MILLIGRAM(S): at 03:00

## 2022-03-03 RX ADMIN — Medication 975 MILLIGRAM(S): at 21:00

## 2022-03-03 RX ADMIN — Medication 975 MILLIGRAM(S): at 15:40

## 2022-03-03 RX ADMIN — Medication 600 MILLIGRAM(S): at 00:11

## 2022-03-03 NOTE — PROGRESS NOTE ADULT - ATTENDING COMMENTS
Att:  Pt seen and examined by me today. I agree with findings, assessment and plan documented in NP note.

## 2022-03-04 VITALS
OXYGEN SATURATION: 96 % | DIASTOLIC BLOOD PRESSURE: 88 MMHG | TEMPERATURE: 98 F | HEART RATE: 66 BPM | SYSTOLIC BLOOD PRESSURE: 135 MMHG

## 2022-03-04 RX ADMIN — Medication 600 MILLIGRAM(S): at 00:30

## 2022-03-04 RX ADMIN — HEPARIN SODIUM 5000 UNIT(S): 5000 INJECTION INTRAVENOUS; SUBCUTANEOUS at 05:26

## 2022-03-04 RX ADMIN — Medication 975 MILLIGRAM(S): at 04:38

## 2022-03-04 RX ADMIN — Medication 975 MILLIGRAM(S): at 03:48

## 2022-03-04 RX ADMIN — Medication 600 MILLIGRAM(S): at 06:32

## 2022-03-04 RX ADMIN — Medication 975 MILLIGRAM(S): at 08:50

## 2022-03-04 RX ADMIN — Medication 600 MILLIGRAM(S): at 05:27

## 2022-03-04 NOTE — PROGRESS NOTE ADULT - SUBJECTIVE AND OBJECTIVE BOX
OB Progress Note: pTLCS, POD#2    S: 27yo now  POD#2 s/p pLTCS QBL 621ml. Pt seen and examine at bedside. No acute events overnight. Pain is well controlled. She is tolerating a regular diet and passing flatus. She is voiding spontaneously, and ambulating without difficulty. Denies CP/SOB. Denies lightheadedness/dizziness. Denies N/V.     O:  Vitals:  Vital Signs Last 24 Hrs  T(C): 36.8 (02 Mar 2022 05:54), Max: 36.9 (01 Mar 2022 14:05)  T(F): 98.2 (02 Mar 2022 05:54), Max: 98.4 (01 Mar 2022 14:05)  HR: 72 (02 Mar 2022 05:54) (63 - 79)  BP: 119/74 (02 Mar 2022 05:54) (119/74 - 126/62)  BP(mean): --  RR: 18 (02 Mar 2022 05:54) (18 - 18)  SpO2: 100% (02 Mar 2022 05:54) (100% - 100%)    MEDICATIONS  (STANDING):  acetaminophen     Tablet .. 975 milliGRAM(s) Oral <User Schedule>  diphtheria/tetanus/pertussis (acellular) Vaccine (ADAcel) 0.5 milliLiter(s) IntraMuscular once  ferrous    sulfate 325 milliGRAM(s) Oral daily  heparin   Injectable 5000 Unit(s) SubCutaneous every 12 hours  ibuprofen  Tablet. 600 milliGRAM(s) Oral every 6 hours  influenza   Vaccine 0.5 milliLiter(s) IntraMuscular once  prenatal multivitamin 1 Tablet(s) Oral daily  senna 1 Tablet(s) Oral two times a day      MEDICATIONS  (PRN):  diphenhydrAMINE 25 milliGRAM(s) Oral every 6 hours PRN Pruritus  lanolin Ointment 1 Application(s) Topical every 6 hours PRN Sore Nipples  magnesium hydroxide Suspension 30 milliLiter(s) Oral two times a day PRN Constipation  nalbuphine Injectable 2.5 milliGRAM(s) IV Push every 6 hours PRN Pruritus  oxyCODONE    IR 5 milliGRAM(s) Oral once PRN Moderate to Severe Pain (4-10)  oxyCODONE    IR 5 milliGRAM(s) Oral every 3 hours PRN Moderate to Severe Pain (4-10)  oxyCODONE    IR 5 milliGRAM(s) Oral once PRN Moderate to Severe Pain (4-10)  simethicone 80 milliGRAM(s) Chew every 4 hours PRN Gas      Labs:  Blood type: O Positive  Rubella IgG: RPR: Negative                          10.8<L>   17.25<H> >-----------< 186    (  @ 07:59 )             30.1<L>                        13.7   11.68<H> >-----------< 224    (  @ 04:05 )             38.3          PE:  General: NAD  Lungs: CTA b/l good airway entry  CVS: RRR S1S2  Abdomen: Soft, appropriately tender, incision c/d/i. steri-strips  Extremities: No erythema, no pitting edema, pedal pulse 2+ bilateral    A/P: 27yo now  POD#2 s/p pLTCS.  Patient is stable and doing well post-operatively.    - Continue Routine Postop/ PP care  - Continue regular diet.  - Increase ambulation.  - Continue Motrin, Tylenol, oxycodone PRN for pain control.   - Reviewed PEC precaution in detail pt verbalized understanding  - Discharge planning   - F/u in MD office 1-2 wks          MONICO Tabor-BC
Patient seen and examined at bedside, resting comfortably in no acute distress. Denies fever, chills, nausea or vomiting. She is tolerating a regular diet. She is ambulating without difficulty and voiding spontaneously. Pain is well controlled. Bleeding is less than a normal menses. Denies passing gas and has not yet had a bowel movement.    Physical Examination:  Vital Signs: Vital Signs Last 24 Hrs  T(C): 36.6 (01 Mar 2022 05:50), Max: 37.2 (01 Mar 2022 01:53)  T(F): 97.9 (01 Mar 2022 05:50), Max: 98.9 (01 Mar 2022 01:53)  HR: 70 (01 Mar 2022 05:50) (64 - 109)  BP: 122/83 (01 Mar 2022 05:50) (93/55 - 136/75)  BP(mean): 82 (28 Feb 2022 13:30) (64 - 85)  RR: 18 (01 Mar 2022 05:50) (13 - 20)  SpO2: 100% (01 Mar 2022 05:50) (97% - 100%)  General: alert and oriented, no acute distress  Cardio: regular rate and rhythm  Respiratory: non labored respirations  Abdomen: soft, non-tender, non-distended; bowel sounds present; uterus firm, palpated below the umbilicus; incision clean, dry and intact, surrounding skin non erythematous  VE: minimal lochia noted  Musculoskeletal: No erythema/edema, no calf tenderness bilaterally    MEDICATIONS  (STANDING):  acetaminophen     Tablet .. 975 milliGRAM(s) Oral <User Schedule>  diphtheria/tetanus/pertussis (acellular) Vaccine (ADAcel) 0.5 milliLiter(s) IntraMuscular once  heparin   Injectable 5000 Unit(s) SubCutaneous every 12 hours  ibuprofen  Tablet. 600 milliGRAM(s) Oral every 6 hours  influenza   Vaccine 0.5 milliLiter(s) IntraMuscular once  ketorolac   Injectable 30 milliGRAM(s) IV Push every 6 hours  lactated ringers. 1000 milliLiter(s) (75 mL/Hr) IV Continuous <Continuous>  lactated ringers. 1000 milliLiter(s) (125 mL/Hr) IV Continuous <Continuous>  oxytocin Infusion 333.333 milliUNIT(s)/Min (1000 mL/Hr) IV Continuous <Continuous>  oxytocin Infusion 390 milliUNIT(s)/Min (1170 mL/Hr) IV Continuous <Continuous>  oxytocin Infusion 41.667 milliUNIT(s)/Min (1000 mL/Hr) IV Continuous <Continuous>      Labs:  Blood type: O Positive  Rubella IgG: RPR: Negative                          10.8<L>   17.25<H> >-----------< 186    ( 03-01 @ 07:59 )             30.1<L>                        13.7   11.68<H> >-----------< 224    ( 02-28 @ 04:05 )             38.3    Assessment and Plan:   25yo P1 s/p pLTCS on 2/28 for cat II remote from delivery now POD#1 .  Patient is stable and doing well post-partum.   Blood type: O+    Plan:  - VS per unit protocol  - SCDs for DVT ppx  - Regular diet  - Pain well controlled, continue current pain regimen  - Encourage ambulation  - Continue wound care  - Discharge instructions reviewed  - D/c planning initiated, patient to follow up in office in 6 weeks for post partum visit    Ziggy Mckeon MD
Day _2__ of Anesthesia Pain Management Service    SUBJECTIVE:  Pain Scale Score	At rest: __ 	With Activity: __ 	[x ] Refer to charted pain scores    THERAPY:    s/p __2_____ mg PF morphine on _2/28/22_____      MEDICATIONS  (STANDING):  acetaminophen     Tablet .. 975 milliGRAM(s) Oral <User Schedule>  diphtheria/tetanus/pertussis (acellular) Vaccine (ADAcel) 0.5 milliLiter(s) IntraMuscular once  heparin   Injectable 5000 Unit(s) SubCutaneous every 12 hours  ibuprofen  Tablet. 600 milliGRAM(s) Oral every 6 hours  influenza   Vaccine 0.5 milliLiter(s) IntraMuscular once  ketorolac   Injectable 30 milliGRAM(s) IV Push every 6 hours  lactated ringers. 1000 milliLiter(s) (75 mL/Hr) IV Continuous <Continuous>  lactated ringers. 1000 milliLiter(s) (125 mL/Hr) IV Continuous <Continuous>  oxytocin Infusion 41.667 milliUNIT(s)/Min (1000 mL/Hr) IV Continuous <Continuous>    MEDICATIONS  (PRN):  diphenhydrAMINE 25 milliGRAM(s) Oral every 6 hours PRN Pruritus  lanolin Ointment 1 Application(s) Topical every 6 hours PRN Sore Nipples  magnesium hydroxide Suspension 30 milliLiter(s) Oral two times a day PRN Constipation  nalbuphine Injectable 2.5 milliGRAM(s) IV Push every 6 hours PRN Pruritus  oxyCODONE    IR 5 milliGRAM(s) Oral every 3 hours PRN Moderate to Severe Pain (4-10)  oxyCODONE    IR 5 milliGRAM(s) Oral once PRN Moderate to Severe Pain (4-10)  simethicone 80 milliGRAM(s) Chew every 4 hours PRN Gas      OBJECTIVE:    Sedation Score:	[x ] Alert	[ ] Drowsy	[ ] Arousable	[ ] Asleep	[ ] Unresponsive    Side Effects:	[ x ] None	[ ] Nausea	[ ] Vomiting	[ ] Pruritus  		  [ ] Weakness		[ ] Numbness	[ ] Other:    Vital Signs Last 24 Hrs  T(C): 36.6 (01 Mar 2022 09:58), Max: 37.2 (01 Mar 2022 01:53)  T(F): 97.8 (01 Mar 2022 09:58), Max: 98.9 (01 Mar 2022 01:53)  HR: 79 (01 Mar 2022 09:58) (64 - 93)  BP: 122/73 (01 Mar 2022 09:58) (93/55 - 136/75)  BP(mean): 82 (28 Feb 2022 13:30) (64 - 85)  RR: 18 (01 Mar 2022 09:58) (13 - 20)  SpO2: 100% (01 Mar 2022 09:58) (97% - 100%)    ASSESSMENT/ PLAN  [x ] Patient transitioned to prn analgesics  [x ] Pain management per primary service, pain service to sign off   [x ]Documentation and Verification of current medications     Comments:
S: 25yo POD#1 s/p LTCS . Her pain is well controlled. She is tolerating a regular diet and passing flatus. Denies N/V. Denies CP/SOB/lightheadedness/dizziness.  She is ambulating without difficulty.  Voiding spontanously.     O:   Vital Signs Last 24 Hrs  T(C): 36.6 (01 Mar 2022 05:50), Max: 37.2 (01 Mar 2022 01:53)  T(F): 97.9 (01 Mar 2022 05:50), Max: 98.9 (01 Mar 2022 01:53)  HR: 70 (01 Mar 2022 05:50) (63 - 109)  BP: 122/83 (01 Mar 2022 05:50) (93/55 - 136/75)  BP(mean): 82 (28 Feb 2022 13:30) (64 - 85)  RR: 18 (01 Mar 2022 05:50) (13 - 20)  SpO2: 100% (01 Mar 2022 05:50) (88% - 100%)    Labs:  Blood type: O Positive  Rubella IgG: RPR: Negative                          13.7   11.68<H> >-----------< 224    ( 02-28 @ 04:05 )             38.3        acetaminophen     Tablet .. 975 milliGRAM(s) Oral <User Schedule>  diphenhydrAMINE 25 milliGRAM(s) Oral every 6 hours PRN  diphtheria/tetanus/pertussis (acellular) Vaccine (ADAcel) 0.5 milliLiter(s) IntraMuscular once  heparin   Injectable 5000 Unit(s) SubCutaneous every 12 hours  ibuprofen  Tablet. 600 milliGRAM(s) Oral every 6 hours  influenza   Vaccine 0.5 milliLiter(s) IntraMuscular once  ketorolac   Injectable 30 milliGRAM(s) IV Push every 6 hours  lactated ringers. 1000 milliLiter(s) IV Continuous <Continuous>  lactated ringers. 1000 milliLiter(s) IV Continuous <Continuous>  lanolin Ointment 1 Application(s) Topical every 6 hours PRN  magnesium hydroxide Suspension 30 milliLiter(s) Oral two times a day PRN  nalbuphine Injectable 2.5 milliGRAM(s) IV Push every 6 hours PRN  oxyCODONE    IR 5 milliGRAM(s) Oral every 3 hours PRN  oxyCODONE    IR 5 milliGRAM(s) Oral once PRN  oxytocin Infusion 333.333 milliUNIT(s)/Min IV Continuous <Continuous>  oxytocin Infusion 390 milliUNIT(s)/Min IV Continuous <Continuous>  oxytocin Infusion 41.667 milliUNIT(s)/Min IV Continuous <Continuous>  simethicone 80 milliGRAM(s) Chew every 4 hours PRN      PE:  General: NAD  Abdomen: Mildly distended, appropriately tender, incision reinforced over L aspect of incision. No active drainage, inflammation, or wound breakdown.   Extremities: No erythema, no pitting edema    A/P: 25yo POD#1 s/p LTCS.  Patient is stable and doing well post-operatively.    - Continue regular diet.  - Increase ambulation.  - Continue motrin, tylenol, oxycodone PRN for pain control  - F/u AM CBC    KAMALA Robles MD  PGY-1 
S: 25yo POD#4 s/p pLTCS for Cat 2 Fetal tracing..  Alert, Ox4. The patient feels well.  Pain is well controlled. She is tolerating a regular diet and passing flatus. She is voiding spontaneously, and ambulating without difficulty. Denies CP/SOB. Denies lightheadedness/dizziness. Denies N/V.    O:  Vitals:  Vital Signs Last 24 Hrs  T(C): 36.5 (04 Mar 2022 05:36), Max: 37 (03 Mar 2022 20:16)  T(F): 97.7 (04 Mar 2022 05:36), Max: 98.6 (03 Mar 2022 20:16)  HR: 71 (04 Mar 2022 05:36) (71 - 82)  BP: 129/74 (04 Mar 2022 05:36) (116/82 - 129/74)  BP(mean): --  RR: 16 (04 Mar 2022 05:36) (16 - 16)  SpO2: 100% (04 Mar 2022 05:36) (98% - 100%)    MEDICATIONS  (STANDING):  acetaminophen     Tablet .. 975 milliGRAM(s) Oral <User Schedule>  diphtheria/tetanus/pertussis (acellular) Vaccine (ADAcel) 0.5 milliLiter(s) IntraMuscular once  ferrous    sulfate 325 milliGRAM(s) Oral daily  heparin   Injectable 5000 Unit(s) SubCutaneous every 12 hours  ibuprofen  Tablet. 600 milliGRAM(s) Oral every 6 hours  prenatal multivitamin 1 Tablet(s) Oral daily  senna 1 Tablet(s) Oral two times a day    MEDICATIONS  (PRN):  diphenhydrAMINE 25 milliGRAM(s) Oral every 6 hours PRN Pruritus  lanolin Ointment 1 Application(s) Topical every 6 hours PRN Sore Nipples  magnesium hydroxide Suspension 30 milliLiter(s) Oral two times a day PRN Constipation  nalbuphine Injectable 2.5 milliGRAM(s) IV Push every 6 hours PRN Pruritus  oxyCODONE    IR 5 milliGRAM(s) Oral once PRN Moderate to Severe Pain (4-10)  oxyCODONE    IR 5 milliGRAM(s) Oral every 3 hours PRN Moderate to Severe Pain (4-10)  simethicone 80 milliGRAM(s) Chew every 4 hours PRN Gas      LABS:  Blood type: O Positive  Rubella IgG: RPR: Negative      Physical exam:  Gen: NAD  Abdomen: Soft, nontender, no distension , firm uterine fundus at umbilicus. BMx2  Incision: Clean, dry, and intact/ Steri-strips c/d/1/  Pelvic: Normal lochia noted  Ext: No Erythema, No Edema , No calf tenderness.    A/P: 25yo POD#4  s/p pLTCS.  Patient is stable and is doing well post-operatively.  - Continue Motrin, Tylenol, Senna, Iron  &  oxycodone PRN for pain control.  - Increase ambulation  - Continue regular diet  -F/u for Incision check 1-2 weeks after d/c.  -F/u for 6 Weeks PP check up with OB.  -PP care reviewed /verbalized understanding ( Fever, Bleeding, Incision,  Infection, Breast ,Bladder, Bowels)  -D/c to home    - Discharge planning    Diana Lau NP        
S: 27yo  POD#3 s/p pLTCS for Cat 2 NFHRT. The patient feels well.  Pain is well controlled. She is tolerating a regular diet and passing flatus. She is voiding spontaneously, and ambulating without difficulty. Denies CP/SOB. Denies lightheadedness/dizziness. Denies N/V.    O:  Vitals:  Vital Signs Last 24 Hrs  T(C): 36.7 (03 Mar 2022 05:59), Max: 36.8 (02 Mar 2022 14:04)  T(F): 98 (03 Mar 2022 05:59), Max: 98.3 (02 Mar 2022 21:55)  HR: 71 (03 Mar 2022 05:59) (71 - 106)  BP: 127/84 (03 Mar 2022 05:59) (120/84 - 127/84)  BP(mean): --  RR: 18 (03 Mar 2022 05:59) (18 - 19)  SpO2: 98% (03 Mar 2022 05:59) (98% - 98%)    MEDICATIONS  (STANDING):  acetaminophen     Tablet .. 975 milliGRAM(s) Oral <User Schedule>  diphtheria/tetanus/pertussis (acellular) Vaccine (ADAcel) 0.5 milliLiter(s) IntraMuscular once  ferrous    sulfate 325 milliGRAM(s) Oral daily  heparin   Injectable 5000 Unit(s) SubCutaneous every 12 hours  ibuprofen  Tablet. 600 milliGRAM(s) Oral every 6 hours  influenza   Vaccine 0.5 milliLiter(s) IntraMuscular once  prenatal multivitamin 1 Tablet(s) Oral daily  senna 1 Tablet(s) Oral two times a day    MEDICATIONS  (PRN):  diphenhydrAMINE 25 milliGRAM(s) Oral every 6 hours PRN Pruritus  lanolin Ointment 1 Application(s) Topical every 6 hours PRN Sore Nipples  magnesium hydroxide Suspension 30 milliLiter(s) Oral two times a day PRN Constipation  nalbuphine Injectable 2.5 milliGRAM(s) IV Push every 6 hours PRN Pruritus  oxyCODONE    IR 5 milliGRAM(s) Oral once PRN Moderate to Severe Pain (4-10)  oxyCODONE    IR 5 milliGRAM(s) Oral every 3 hours PRN Moderate to Severe Pain (4-10)  simethicone 80 milliGRAM(s) Chew every 4 hours PRN Gas      LABS:  Blood type: O Positive  Rubella IgG: RPR: Negative                          10.8<L>   17.25<H> >-----------< 186    ( 03-01 @ 07:59 )             30.1<L>                  Physical exam:  Gen: NAD  Abdomen: Palpate Soft, nontender, no distension , firm uterine fundus at umbilicus.   Incision: Clean, dry, and intact, Steri-strips c/d/i  Pelvic: Normal lochia noted  Ext: No erythema, No Pitting Edema   No calf tenderness,     A/P: 27yo POD#3 s/p pLTCS.  Patient is stable and is doing well post-operatively.  - Continue Motrin, Tylenol, stool softener, PNV, Iron &  oxycodone PRN for pain control.  - Increase ambulation  - Continue regular diet  -F/U for incision check in 2 weeks  -F/U for PP check in 6 weeks  -PP care reviewed with pt. ( Fever, infection, incision, Breast, Bladder, Bowels, Ambulation & Pain) Verbalized understanding  - Discharge planning    Diana Lau NP        
pt seen and evaluated by me, POD # 2  doing well,  continue routine post op care

## 2022-03-13 LAB — SURGICAL PATHOLOGY STUDY: SIGNIFICANT CHANGE UP

## 2022-03-31 ENCOUNTER — APPOINTMENT (OUTPATIENT)
Dept: OBGYN | Facility: CLINIC | Age: 27
End: 2022-03-31

## 2022-04-01 ENCOUNTER — APPOINTMENT (OUTPATIENT)
Dept: OBGYN | Facility: CLINIC | Age: 27
End: 2022-04-01
Payer: COMMERCIAL

## 2022-04-01 VITALS
HEIGHT: 64 IN | SYSTOLIC BLOOD PRESSURE: 121 MMHG | WEIGHT: 146 LBS | HEART RATE: 80 BPM | DIASTOLIC BLOOD PRESSURE: 84 MMHG | BODY MASS INDEX: 24.92 KG/M2

## 2022-04-01 DIAGNOSIS — Z34.03 ENCOUNTER FOR SUPERVISION OF NORMAL FIRST PREGNANCY, THIRD TRIMESTER: ICD-10-CM

## 2022-04-01 DIAGNOSIS — O99.891 OTHER SPECIFIED DISEASES AND CONDITIONS COMPLICATING PREGNANCY: ICD-10-CM

## 2022-04-01 DIAGNOSIS — R82.71 OTHER SPECIFIED DISEASES AND CONDITIONS COMPLICATING PREGNANCY: ICD-10-CM

## 2022-04-01 PROCEDURE — 96372 THER/PROPH/DIAG INJ SC/IM: CPT

## 2022-04-01 PROCEDURE — 0503F POSTPARTUM CARE VISIT: CPT

## 2022-04-01 RX ORDER — MEDROXYPROGESTERONE ACETATE 150 MG/ML
150 INJECTION, SUSPENSION INTRAMUSCULAR
Qty: 0 | Refills: 0 | Status: COMPLETED | OUTPATIENT
Start: 2022-04-01

## 2022-04-01 RX ORDER — GLUC/MSM/COLGN2/HYAL/ANTIARTH3 375-375-20
TABLET ORAL
Refills: 0 | Status: DISCONTINUED | COMMUNITY
End: 2022-04-01

## 2022-04-01 RX ORDER — TERCONAZOLE 4 MG/G
0.4 CREAM VAGINAL
Qty: 1 | Refills: 0 | Status: DISCONTINUED | COMMUNITY
Start: 2022-02-18 | End: 2022-04-01

## 2022-04-01 RX ADMIN — MEDROXYPROGESTERONE ACETATE 0 MG/ML: 150 INJECTION, SUSPENSION INTRAMUSCULAR at 00:00

## 2022-04-01 NOTE — HISTORY OF PRESENT ILLNESS
[Postpartum Follow Up] : postpartum follow up [Delivery Date: ___] : on [unfilled] [Primary C/S] : delivered by  section [Female] : Delivery History: baby girl [Rubella Vaccine] : Rubella vaccine administered [Pertussis Vaccine] : Pertussis vaccine administered [Girl] : baby is a girl [Infant's Name ___] : [unfilled] [___ Lbs] : [unfilled] lbs [___ Oz] : [unfilled] oz [Living at Home] : is currently living at home [Bottle Feeding] : bottle feeding [Complications:___] : no complications [Rhogam] : Rhogam was not administered [BTL] : no tubal ligation [Breastfeeding] : not currently nursing [Resumed Menses] : has not resumed her menses [Resumed [place]] : has not resumed intercourse [Intended Contraception] : the patient does not intended to use contraception postpartum [Clean/Dry/Intact] : clean, dry and intact [Erythema] : not erythematous [Swelling] : not swollen [Dehiscence] : not dehisced [Awake] : awake [Alert] : alert [Acute Distress] : no acute distress [Mass] : no breast mass [Nipple Discharge] : no nipple discharge [Axillary LAD] : no axillary lymphadenopathy [Soft] : soft [Tender] : non tender [Distended] : not distended [Oriented x3] : oriented to person, place, and time [Depressed Mood] : not depressed [Flat Affect] : affect not flat [Normal] : external genitalia [Motion Tenderness] : there was no cervical motion tenderness [Tenderness] : nontender [Adnexa Tenderness] : were not tender [Doing Well] : is doing well [No Sign of Infection] : is showing no signs of infection [Excellent Pain Control] : has excellent pain control [None] : None [[email or web address]] : This 25 yo P 1041 presents for PPV after primary C/S for Cat II FH and growth restriction; feels well, voiding and stooling without issue; desires Depo Provera injection, which she has used in the past. [de-identified] : Nl PPV [de-identified] : Depo Provera injection today- reinforced diet and exercise; RTO prn or in 12 weeks for Depo injection with annual

## 2022-05-05 NOTE — DISCHARGE NOTE OB - NS AS DC FU INST LIST INST
FYI:    PT COMING IN ON 5/9 FOR ER F/U - PT WENT TO [facility]. POSSIBLE SEIZURE. PT HAS BEEN REALLY BAD MIGRAINES.     Chantelle Self
Noted, thanks.
no

## 2022-07-01 ENCOUNTER — APPOINTMENT (OUTPATIENT)
Dept: OBGYN | Facility: CLINIC | Age: 27
End: 2022-07-01

## 2022-07-01 ENCOUNTER — NON-APPOINTMENT (OUTPATIENT)
Age: 27
End: 2022-07-01

## 2022-07-01 DIAGNOSIS — Z30.9 ENCOUNTER FOR CONTRACEPTIVE MANAGEMENT, UNSPECIFIED: ICD-10-CM

## 2022-07-01 RX ORDER — MEDROXYPROGESTERONE ACETATE 150 MG/ML
150 INJECTION, SUSPENSION INTRAMUSCULAR
Qty: 1 | Refills: 0 | Status: ACTIVE | COMMUNITY
Start: 2022-07-01 | End: 1900-01-01

## 2022-08-02 NOTE — ED ADULT NURSE NOTE - EENT WDL
What Type Of Note Output Would You Prefer (Optional)?: Bullet Format What Is The Reason For Today's Visit?: Full Body Skin Examination What Is The Reason For Today's Visit? (Being Monitored For X): concerning skin lesions on an annual basis Eyes with no visual disturbances.  Ears clean and dry and no hearing difficulties. Nose with pink mucosa and no drainage.  Mouth mucous membranes moist and pink.  No tenderness or swelling to throat or neck.

## 2022-10-19 NOTE — OB RN PATIENT PROFILE - SURGICAL SITE INCISION
"Chief Complaint  Memory Loss    Subjective            Radha Barbour presents to [facility] NEUROLOGY for Memory and dizziness  History of Present Illness  New patient referred by Michell FUENTES for memory and dizziness.    Pt has noted trouble with memory for year or two , mostly remembering recent items.   Pt has gotten lost driving so has not driven for 6 months.   No need for help with ADL's  Gets confused easily , worse in evening.   No hallucinations.    Memory- she is currently taking namenda 10 mg 1 bid, and aricept 5 mg 1 qd. Has been having issues with this approx 1 year.     Dizziness- currently taking meclizine 25 mg 1 tid prn, has had approx 1 year. Sometimes feels more unsteady vs dizzy. Some time vertigo, mostly poor balance.   She has fallen a lot.    Pt had dementia onset mid 70's and two of her mothers sisters at least one with Alzheimer.    Pt does snore and co fatigue during the day.    ====MRI OREN WO 4/6/22=====  IMPRESSION:  NO ACUTE INTRACRANIAL ABNORMALITY WITH MILD SMALL VESSEL ISCHEMIC CHANGES.    =====CT HEAD WO CONTRAST 3/5/22=====  IMPRESSION:   Negative      Maximum   Score Patient's   Score Questions   5 3 \"What is the year?Season?Date?Day of the week?Month?\"   5 5 \"Where are we now: State?County?Town/city?Hospital?Floor?\"   3 3 3 Unrelated objects Number of trials:___   5 5 Count backward from 100 by sevens or spell WORLD backwards   3 2 Name 3 things from above   2 2 Identify 2 objects   1 0 Repeat the phrase: No ifs, ands,or buts.   3 3 Take paper in right hand, fold it in half, and put it on the floor.   1 1 Please read this and do what it says. \"Close your eyes\"   1 1 Make up and write a sentence about anything. Noun and verb   1 1 Copy this picture 10 angles must be present.   30 26 Total MMSE       Family History   Problem Relation Age of Onset   • Arthritis Mother    • Colon cancer Mother    • Thyroid disease Mother    • Stroke Mother    • Cancer " Mother         Colon   • Dementia Mother    • Heart disease Sister    • Heart disease Sister    • Dementia Maternal Aunt    • Dementia Maternal Aunt        Past Medical History:   Diagnosis Date   • Anxiety    • Arthritis 1998   • B12 deficiency 07/08/2016    Last Assessment & Plan:  Formatting of this note might be different from the original.  Compliant and controlled with current medication B 12 supplements . Lab today B 12   • Back pain, chronic 07/08/2016    Last Assessment & Plan:  Formatting of this note might be different from the original. Will refer to PT   • Cancer ([facility]) 2009    Colon   • Colon polyp 2005    Colon Cancer   • Dementia ([facility]) 2021    Doctor prescribed medicine for dementia/memory loss   • Depression 07/08/2016    Last Assessment & Plan:  Formatting of this note might be different from the original.  Compliant and controlled with current medication   • Diabetic peripheral neuropathy ([facility]) 02/14/2020    Last Assessment & Plan:  Formatting of this note might be different from the original. Stable   • Difficulty walking 2022    Extreme unsteadiness on feet   • DM type 2, goal HbA1c < 7% ([facility]) 03/27/2017    Last Assessment & Plan:  Formatting of this note might be different from the original.  Compliant and controlled with current diet.llabs today A1c   • Dyslipidemia 10/11/2017    Last Assessment & Plan:  Formatting of this note might be different from the original.  Compliant and controlled with current medication/statin therapy/labs today   • Environmental and seasonal allergies 10/11/2019   • Gastric reflux 03/27/2017   • HTN, goal below 130/80 02/10/2016    Last Assessment & Plan:  Formatting of this note might be different from the original. Compliant and controlled with current medication ramipril /labs today cmp to check renal function and electrolytes   • Hyperlipidemia    • Hypothyroidism 05/20/2014    Last Assessment & Plan:  Formatting of this note might be different from the  original. TSH low reduce levothyroxine.  Will check TSH today.   • Insomnia 2015   • Lumbosacral radiculopathy 2020    Last Assessment & Plan:  Formatting of this note might be different from the original.  Compliant and controlled with current medication Muscle relaxer   • Memory loss    • Osteoporosis 2020    Formatting of this note is different from the original. RADIOLOGY REPORT   FACILITY:  [facility] UNIT/AGE/GENDER: [id number]  OP      AGE:72 Y          SEX:F PATIENT NAME/:  GM DERAS    1947 UNIT NUMBER:  [id number] ACCOUNT NUMBER:  [id number] ACCESSION NUMBER:  [id number]     EXAMINATION: Bone densitometry of multiple sites, lumbar spine, left hip and distal   • Restless leg syndrome 2018    Last Assessment & Plan:  Formatting of this note might be different from the original.  Compliant and controlled with current medication requip   • Shortness of breath        Social History     Socioeconomic History   • Marital status:    Tobacco Use   • Smoking status: Former     Packs/day: 2.00     Years: 20.00     Pack years: 40.00     Types: Cigarettes     Quit date: 1998     Years since quittin.8   • Smokeless tobacco: Never   Vaping Use   • Vaping Use: Never used   Substance and Sexual Activity   • Alcohol use: Yes     Comment: socially   • Drug use: Never     Comment: CBD oils   • Sexual activity: Not Currently     Partners: Male     Birth control/protection: Post-menopausal, None         Current Outpatient Medications:   •  amLODIPine (NORVASC) 5 MG tablet, Take 1 tablet by mouth Daily., Disp: 90 tablet, Rfl: 3  •  aspirin 81 MG EC tablet, Take 81 mg by mouth Daily., Disp: , Rfl:   •  buPROPion (WELLBUTRIN) 100 MG tablet, Take 1 tablet by mouth 2 (Two) Times a Day., Disp: 180 tablet, Rfl: 0  •  CBD (cannabidiol) oral oil, Take 1 drop by mouth Daily As Needed., Disp: , Rfl:   •  cloNIDine (CATAPRES) 0.2 MG tablet, Take 1 tablet by  mouth Daily., Disp: 90 tablet, Rfl: 1  •  donepezil (Aricept) 5 MG tablet, Take 2 tablets by mouth Every Night., Disp: 90 tablet, Rfl: 3  •  gabapentin (NEURONTIN) 300 MG capsule, Take 1 capsule by mouth 3 (Three) Times a Day., Disp: 270 capsule, Rfl: 0  •  hydrOXYzine (ATARAX) 10 MG tablet, Take 1 tablet by mouth Daily., Disp: 90 tablet, Rfl: 1  •  levothyroxine (SYNTHROID, LEVOTHROID) 150 MCG tablet, TAKE ONE TABLET BY MOUTH DAILY, Disp: 30 tablet, Rfl: 1  •  meclizine (ANTIVERT) 25 MG tablet, TAKE 1 TABLET THREE TIMES DAILY AS NEEDED FOR DIZZINESS, Disp: 180 tablet, Rfl: 0  •  memantine (Namenda) 10 MG tablet, Take 1 tablet by mouth 2 (Two) Times a Day., Disp: 180 tablet, Rfl: 1  •  metFORMIN (Glucophage) 1000 MG tablet, Take 1 tablet by mouth Daily With Breakfast., Disp: 90 tablet, Rfl: 1  •  omeprazole (priLOSEC) 40 MG capsule, Take 1 capsule by mouth Daily., Disp: 90 capsule, Rfl: 1  •  pravastatin (Pravachol) 20 MG tablet, Take 1 tablet by mouth Daily., Disp: 90 tablet, Rfl: 1  •  ramipril (ALTACE) 2.5 MG capsule, Take 2 capsules by mouth Daily., Disp: 90 capsule, Rfl: 1  •  rOPINIRole (REQUIP) 0.5 MG tablet, Take 1 tablet by mouth every night at bedtime., Disp: 90 tablet, Rfl: 1  •  venlafaxine XR (EFFEXOR-XR) 150 MG 24 hr capsule, Take 1 capsule by mouth 2 (Two) Times a Day., Disp: 180 capsule, Rfl: 1  •  vitamin B-12 (CYANOCOBALAMIN) 1000 MCG tablet, Take 1,000 mcg by mouth Daily., Disp: , Rfl:   •  Vitamin D, Cholecalciferol, 50 MCG (2000 UT) capsule, Take 2,000 Units by mouth Daily., Disp: , Rfl:     Review of Systems   HENT: Negative.    Eyes: Negative.    Respiratory: Positive for shortness of breath.    Cardiovascular: Negative.    Gastrointestinal: Negative.    Endocrine: Negative.    Genitourinary: Negative.    Musculoskeletal: Positive for gait problem.   Allergic/Immunologic: Negative.    Neurological: Positive for dizziness, weakness and light-headedness.   Hematological: Bruises/bleeds easily.  "  Psychiatric/Behavioral: Positive for confusion and decreased concentration.            Objective   Vital Signs:   /77 (BP Location: Left arm, Patient Position: Sitting, Cuff Size: Large Adult)   Pulse 61   Temp 97.5 °F (36.4 °C) (Infrared)   Ht 165.1 cm (65\")   Wt 82.6 kg (182 lb 3.2 oz)   BMI 30.32 kg/m²     Physical Exam  Vitals reviewed.   HENT:      Head: Normocephalic.      Mouth/Throat:      Mouth: Mucous membranes are moist.   Eyes:      Pupils: Pupils are equal, round, and reactive to light.   Cardiovascular:      Rate and Rhythm: Normal rate and regular rhythm.      Pulses: Normal pulses.   Pulmonary:      Effort: Pulmonary effort is normal. No respiratory distress.   Musculoskeletal:      Right lower leg: No edema.      Left lower leg: No edema.   Neurological:      Mental Status: She is alert and oriented to person, place, and time.      Motor: Motor strength is normal.      Coordination: Romberg Test abnormal.      Deep Tendon Reflexes:      Reflex Scores:       Bicep reflexes are 1+ on the right side and 1+ on the left side.       Patellar reflexes are 0 on the right side and 0 on the left side.       Achilles reflexes are 0 on the right side and 0 on the left side.  Psychiatric:         Mood and Affect: Mood normal.         Speech: Speech normal.        Result Review :                Neurologic Exam     Mental Status   Oriented to person, place, and time.   Attention: normal.   Speech: speech is normal   Level of consciousness: alert  Knowledge: good.     Cranial Nerves     CN III, IV, VI   Pupils are equal, round, and reactive to light.    CN V   Facial sensation intact.     CN VII   Facial expression full, symmetric.     CN VIII   CN VIII normal.     CN IX, X   CN IX normal.     Motor Exam   Muscle bulk: normal    Strength   Strength 5/5 throughout.     Sensory Exam   Light touch normal.   Vibration normal.     Gait, Coordination, and Reflexes     Gait  Gait: wide-based    Coordination "   Romberg: positive    Tremor   Resting tremor: absent  Intention tremor: absent  Action tremor: absent    Reflexes   Right biceps: 1+  Left biceps: 1+  Right patellar: 0  Left patellar: 0  Right achilles: 0  Left achilles: 0             Assessment and Plan    Diagnoses and all orders for this visit:    1. Cognitive impairment (Primary)  -     donepezil (Aricept) 5 MG tablet; Take 2 tablets by mouth Every Night.  Dispense: 90 tablet; Refill: 3  -     Home Sleep Study; Future  -     RPR; Future  -     Vitamin E; Future  -     Vitamin B6 (Pyridoxine); Future    2. Restless leg syndrome    3. Insomnia, unspecified type    4. Vitamin D deficiency    5. B12 deficiency    6. Type 2 diabetes mellitus with other specified complication, without long-term current use of insulin ([facility])    7. LEANA (obstructive sleep apnea)  -     Home Sleep Study; Future      Pt has mild cognitive and memory impairment  Mri brain normal  Contributing factors include age, family history, diabetes  Recent aic was about 6.9. hypothyroidism low b12 and vit d have been treated  On namenda 10mg bid and aricept 5 mg, will increase dose to 10 mg per day.  Pt does snore and co fatigue, will obtain hst.      Follow Up   Return in about 3 months (around 1/20/2023).  Patient was given instructions and counseling regarding her condition or for health maintenance advice. Please see specific information pulled into the AVS if appropriate.         This document has been electronically signed by Joseph Seipel, MD on October 20, 2022 13:48 EDT     no

## 2022-11-18 ENCOUNTER — APPOINTMENT (OUTPATIENT)
Dept: OBGYN | Facility: CLINIC | Age: 27
End: 2022-11-18

## 2022-11-23 NOTE — OB RN TRIAGE NOTE - NSICDXFAMHXNEG_GEN_ALL
pt sent in by obgyn for r/o ectopic pregnancy. as per pt , md states they do not see anything in the uterus + pregnancy test in office,   LMP 10/11. denies n/v/pain/ bleeding
denies

## 2024-02-12 NOTE — OB PROVIDER H&P - HISTORY OF PRESENT ILLNESS
Length To Time In Minutes Device Was In Place: 10 27 y/o  at 39.5 weeks gestation c/o ctx q 5-7 mins, pain 10 out of 10 on pain scale. Denies lof, vb, back pain. Reports good FM. Pt is COVID vaccinated.    Pt was a late transfer of care @ 33 wks from [facility]. Last M sonogram  states baby measuring in the 13th percentile.    AP course complicated by GBS +, and BV infection in January and February (pt finished prescribed Terazol for yeast infection last night)  NKDA  Current medications:  -Iron  -PNV  OBGYN history:  -2 TOP w/ 1 D&C  Medical history:  -sickle cell trait - FOB tested ?negative  Surgical history:  -D&C  Denies psych history  Denies smoking, alcohol, and illicit drug use

## 2024-09-19 ENCOUNTER — APPOINTMENT (OUTPATIENT)
Dept: OBGYN | Facility: CLINIC | Age: 29
End: 2024-09-19
Payer: MEDICAID

## 2024-09-19 VITALS
HEIGHT: 64 IN | HEART RATE: 64 BPM | SYSTOLIC BLOOD PRESSURE: 120 MMHG | WEIGHT: 137 LBS | BODY MASS INDEX: 23.39 KG/M2 | DIASTOLIC BLOOD PRESSURE: 75 MMHG

## 2024-09-19 DIAGNOSIS — Z01.419 ENCOUNTER FOR GYNECOLOGICAL EXAMINATION (GENERAL) (ROUTINE) W/OUT ABNORMAL FINDINGS: ICD-10-CM

## 2024-09-19 PROCEDURE — 96127 BRIEF EMOTIONAL/BEHAV ASSMT: CPT

## 2024-09-19 PROCEDURE — 99395 PREV VISIT EST AGE 18-39: CPT | Mod: 25

## 2024-09-19 PROCEDURE — 99459 PELVIC EXAMINATION: CPT

## 2024-09-19 NOTE — PHYSICAL EXAM
[Chaperone Present] : A chaperone was present in the examining room during all aspects of the physical examination [Appropriately responsive] : appropriately responsive [Alert] : alert [No Acute Distress] : no acute distress [No Lymphadenopathy] : no lymphadenopathy [Soft] : soft [Non-tender] : non-tender [Non-distended] : non-distended [No HSM] : No HSM [No Lesions] : no lesions [No Mass] : no mass [Examination Of The Breasts] : a normal appearance [No Masses] : no breast masses were palpable [Labia Majora] : normal [Labia Minora] : normal [Normal] : normal [Uterine Adnexae] : normal [09057] : A chaperone was present during the pelvic exam. [FreeTextEntry2] : MELANIE Gil

## 2024-09-19 NOTE — HISTORY OF PRESENT ILLNESS
[FreeTextEntry1] : 29 year old [id number] for annual GYN exam. LMP: 9/6/24 Pt reports monthly menses. She denies intermenstrual bleeding, itching, burning. Pt recently stopped OCPS due to mood changes and has also been abstinent for 4 months. [N] : Patient is not sexually active [FreeTextEntry3] : Discontinued OCPs

## 2024-09-19 NOTE — PLAN
[FreeTextEntry1] : 29 year old  female presents for annual GYN exam.   Pap done today GC testing done today

## 2024-09-19 NOTE — SIGNATURES
[TextEntry] : This note was written by Ember Singh on 09/19/2024 actively solely MICHEL Estrada M.D 09/19/2024 . All medical record entries made by this scribe were at my  MICHEL Estrada M.D  direction and personally dictated by me on 09/19/2024 . I have personally reviewed my chart and agree that the record reflects my personal performance of the history, physical exam, assessment, and plan.

## 2024-09-21 LAB
C TRACH RRNA SPEC QL NAA+PROBE: NOT DETECTED
N GONORRHOEA RRNA SPEC QL NAA+PROBE: NOT DETECTED
SOURCE TP AMPLIFICATION: NORMAL

## 2024-09-25 LAB — CYTOLOGY CVX/VAG DOC THIN PREP: ABNORMAL

## 2025-04-14 NOTE — OB RN PREOPERATIVE CHECKLIST - NOTHING BY MOUTH SINCE
Patient to Peds 46 with parents. Reviewed and agree with triage note. Primary assessment completed. Pt awake, alert, age appropriate. Denies any other sx. Call light within reach. No further questions or concerns. Chart up for ERP.      27-Feb-2022

## 2025-05-12 DIAGNOSIS — N94.89 OTHER SPECIFIED CONDITIONS ASSOCIATED WITH FEMALE GENITAL ORGANS AND MENSTRUAL CYCLE: ICD-10-CM

## 2025-05-12 RX ORDER — NORETHINDRONE ACETATE 5 MG/1
5 TABLET ORAL
Qty: 60 | Refills: 0 | Status: ACTIVE | COMMUNITY
Start: 2025-05-12 | End: 1900-01-01

## 2025-05-23 ENCOUNTER — INPATIENT (INPATIENT)
Facility: HOSPITAL | Age: 30
LOS: 0 days | Discharge: HOME | End: 2025-05-23
Attending: STUDENT IN AN ORGANIZED HEALTH CARE EDUCATION/TRAINING PROGRAM | Admitting: STUDENT IN AN ORGANIZED HEALTH CARE EDUCATION/TRAINING PROGRAM
Payer: MEDICAID

## 2025-05-23 ENCOUNTER — TRANSCRIPTION ENCOUNTER (OUTPATIENT)
Age: 30
End: 2025-05-23

## 2025-05-23 ENCOUNTER — RESULT REVIEW (OUTPATIENT)
Age: 30
End: 2025-05-23

## 2025-05-23 VITALS
HEART RATE: 100 BPM | SYSTOLIC BLOOD PRESSURE: 112 MMHG | DIASTOLIC BLOOD PRESSURE: 75 MMHG | WEIGHT: 136.91 LBS | RESPIRATION RATE: 14 BRPM | OXYGEN SATURATION: 97 % | HEIGHT: 65 IN | TEMPERATURE: 98 F

## 2025-05-23 VITALS
TEMPERATURE: 99 F | SYSTOLIC BLOOD PRESSURE: 120 MMHG | RESPIRATION RATE: 14 BRPM | OXYGEN SATURATION: 97 % | HEART RATE: 90 BPM | DIASTOLIC BLOOD PRESSURE: 69 MMHG

## 2025-05-23 DIAGNOSIS — Z98.890 OTHER SPECIFIED POSTPROCEDURAL STATES: Chronic | ICD-10-CM

## 2025-05-23 DIAGNOSIS — O00.90 UNSPECIFIED ECTOPIC PREGNANCY WITHOUT INTRAUTERINE PREGNANCY: ICD-10-CM

## 2025-05-23 LAB
ALBUMIN SERPL ELPH-MCNC: 4 G/DL — SIGNIFICANT CHANGE UP (ref 3.3–5)
ALP SERPL-CCNC: 38 U/L — LOW (ref 40–120)
ALT FLD-CCNC: 7 U/L — SIGNIFICANT CHANGE UP (ref 4–33)
ANION GAP SERPL CALC-SCNC: 11 MMOL/L — SIGNIFICANT CHANGE UP (ref 7–14)
APPEARANCE UR: CLEAR — SIGNIFICANT CHANGE UP
AST SERPL-CCNC: 14 U/L — SIGNIFICANT CHANGE UP (ref 4–32)
BACTERIA # UR AUTO: ABNORMAL /HPF
BASOPHILS # BLD AUTO: 0.04 K/UL — SIGNIFICANT CHANGE UP (ref 0–0.2)
BASOPHILS NFR BLD AUTO: 0.4 % — SIGNIFICANT CHANGE UP (ref 0–2)
BILIRUB SERPL-MCNC: 0.5 MG/DL — SIGNIFICANT CHANGE UP (ref 0.2–1.2)
BILIRUB UR-MCNC: NEGATIVE — SIGNIFICANT CHANGE UP
BLD GP AB SCN SERPL QL: NEGATIVE — SIGNIFICANT CHANGE UP
BUN SERPL-MCNC: 12 MG/DL — SIGNIFICANT CHANGE UP (ref 7–23)
CALCIUM SERPL-MCNC: 8.9 MG/DL — SIGNIFICANT CHANGE UP (ref 8.4–10.5)
CAST: 0 /LPF — SIGNIFICANT CHANGE UP (ref 0–4)
CHLORIDE SERPL-SCNC: 101 MMOL/L — SIGNIFICANT CHANGE UP (ref 98–107)
CO2 SERPL-SCNC: 22 MMOL/L — SIGNIFICANT CHANGE UP (ref 22–31)
COLOR SPEC: YELLOW — SIGNIFICANT CHANGE UP
COMMENT - URINE: SIGNIFICANT CHANGE UP
CREAT SERPL-MCNC: 0.63 MG/DL — SIGNIFICANT CHANGE UP (ref 0.5–1.3)
DIFF PNL FLD: ABNORMAL
EGFR: 124 ML/MIN/1.73M2 — SIGNIFICANT CHANGE UP
EGFR: 124 ML/MIN/1.73M2 — SIGNIFICANT CHANGE UP
EOSINOPHIL # BLD AUTO: 0.08 K/UL — SIGNIFICANT CHANGE UP (ref 0–0.5)
EOSINOPHIL NFR BLD AUTO: 0.8 % — SIGNIFICANT CHANGE UP (ref 0–6)
EPI CELLS # UR: PRESENT
GLUCOSE SERPL-MCNC: 110 MG/DL — HIGH (ref 70–99)
GLUCOSE UR QL: NEGATIVE MG/DL — SIGNIFICANT CHANGE UP
HCG SERPL-ACNC: 2362 MIU/ML — SIGNIFICANT CHANGE UP
HCT VFR BLD CALC: 31.4 % — LOW (ref 34.5–45)
HCV AB S/CO SERPL IA: 0.13 S/CO — SIGNIFICANT CHANGE UP (ref 0–0.79)
HCV AB SERPL-IMP: SIGNIFICANT CHANGE UP
HGB BLD-MCNC: 11.1 G/DL — LOW (ref 11.5–15.5)
HIV 1+2 AB+HIV1 P24 AG SERPL QL IA: SIGNIFICANT CHANGE UP
IANC: 7.16 K/UL — SIGNIFICANT CHANGE UP (ref 1.8–7.4)
IMM GRANULOCYTES NFR BLD AUTO: 0.3 % — SIGNIFICANT CHANGE UP (ref 0–0.9)
KETONES UR QL: 15 MG/DL
LACTATE SERPL-SCNC: 0.8 MMOL/L — SIGNIFICANT CHANGE UP (ref 0.5–2)
LEUKOCYTE ESTERASE UR-ACNC: ABNORMAL
LYMPHOCYTES # BLD AUTO: 2.13 K/UL — SIGNIFICANT CHANGE UP (ref 1–3.3)
LYMPHOCYTES # BLD AUTO: 21.4 % — SIGNIFICANT CHANGE UP (ref 13–44)
MCHC RBC-ENTMCNC: 28.8 PG — SIGNIFICANT CHANGE UP (ref 27–34)
MCHC RBC-ENTMCNC: 35.4 G/DL — SIGNIFICANT CHANGE UP (ref 32–36)
MCV RBC AUTO: 81.6 FL — SIGNIFICANT CHANGE UP (ref 80–100)
MONOCYTES # BLD AUTO: 0.5 K/UL — SIGNIFICANT CHANGE UP (ref 0–0.9)
MONOCYTES NFR BLD AUTO: 5 % — SIGNIFICANT CHANGE UP (ref 2–14)
NEUTROPHILS # BLD AUTO: 7.16 K/UL — SIGNIFICANT CHANGE UP (ref 1.8–7.4)
NEUTROPHILS NFR BLD AUTO: 72.1 % — SIGNIFICANT CHANGE UP (ref 43–77)
NITRITE UR-MCNC: POSITIVE
NRBC # BLD AUTO: 0 K/UL — SIGNIFICANT CHANGE UP (ref 0–0)
NRBC # FLD: 0 K/UL — SIGNIFICANT CHANGE UP (ref 0–0)
NRBC BLD AUTO-RTO: 0 /100 WBCS — SIGNIFICANT CHANGE UP (ref 0–0)
PH UR: 7 — SIGNIFICANT CHANGE UP (ref 5–8)
PLATELET # BLD AUTO: 283 K/UL — SIGNIFICANT CHANGE UP (ref 150–400)
POTASSIUM SERPL-MCNC: 3.6 MMOL/L — SIGNIFICANT CHANGE UP (ref 3.5–5.3)
POTASSIUM SERPL-SCNC: 3.6 MMOL/L — SIGNIFICANT CHANGE UP (ref 3.5–5.3)
PROT SERPL-MCNC: 6.8 G/DL — SIGNIFICANT CHANGE UP (ref 6–8.3)
PROT UR-MCNC: 30 MG/DL
RBC # BLD: 3.85 M/UL — SIGNIFICANT CHANGE UP (ref 3.8–5.2)
RBC # FLD: 14.1 % — SIGNIFICANT CHANGE UP (ref 10.3–14.5)
RBC CASTS # UR COMP ASSIST: 106 /HPF — HIGH (ref 0–4)
RH IG SCN BLD-IMP: POSITIVE — SIGNIFICANT CHANGE UP
SODIUM SERPL-SCNC: 134 MMOL/L — LOW (ref 135–145)
SP GR SPEC: 1.03 — HIGH (ref 1–1.03)
SQUAMOUS # UR AUTO: 3 /HPF — SIGNIFICANT CHANGE UP (ref 0–5)
UROBILINOGEN FLD QL: 1 MG/DL — SIGNIFICANT CHANGE UP (ref 0.2–1)
WBC # BLD: 9.94 K/UL — SIGNIFICANT CHANGE UP (ref 3.8–10.5)
WBC # FLD AUTO: 9.94 K/UL — SIGNIFICANT CHANGE UP (ref 3.8–10.5)
WBC UR QL: 1 /HPF — SIGNIFICANT CHANGE UP (ref 0–5)

## 2025-05-23 PROCEDURE — 88305 TISSUE EXAM BY PATHOLOGIST: CPT | Mod: 26

## 2025-05-23 PROCEDURE — 59151 TREAT ECTOPIC PREGNANCY: CPT | Mod: GC

## 2025-05-23 PROCEDURE — 76830 TRANSVAGINAL US NON-OB: CPT | Mod: 26

## 2025-05-23 PROCEDURE — 99285 EMERGENCY DEPT VISIT HI MDM: CPT

## 2025-05-23 RX ORDER — ONDANSETRON HCL/PF 4 MG/2 ML
4 VIAL (ML) INJECTION ONCE
Refills: 0 | Status: COMPLETED | OUTPATIENT
Start: 2025-05-23 | End: 2025-05-23

## 2025-05-23 RX ORDER — HYDROMORPHONE/SOD CHLOR,ISO/PF 2 MG/10 ML
0.5 SYRINGE (ML) INJECTION
Refills: 0 | Status: DISCONTINUED | OUTPATIENT
Start: 2025-05-23 | End: 2025-05-23

## 2025-05-23 RX ORDER — SODIUM CHLORIDE 9 G/1000ML
1000 INJECTION, SOLUTION INTRAVENOUS
Refills: 0 | Status: DISCONTINUED | OUTPATIENT
Start: 2025-05-23 | End: 2025-05-23

## 2025-05-23 RX ORDER — OXYCODONE HYDROCHLORIDE 30 MG/1
1 TABLET ORAL
Qty: 5 | Refills: 0
Start: 2025-05-23

## 2025-05-23 RX ORDER — NITROFURANTOIN MACROCRYSTAL 100 MG
1 CAPSULE ORAL
Qty: 14 | Refills: 0
Start: 2025-05-23 | End: 2025-05-29

## 2025-05-23 RX ORDER — OXYCODONE HYDROCHLORIDE 30 MG/1
5 TABLET ORAL ONCE
Refills: 0 | Status: DISCONTINUED | OUTPATIENT
Start: 2025-05-23 | End: 2025-05-23

## 2025-05-23 RX ORDER — ACETAMINOPHEN 500 MG/5ML
1000 LIQUID (ML) ORAL ONCE
Refills: 0 | Status: COMPLETED | OUTPATIENT
Start: 2025-05-23 | End: 2025-05-23

## 2025-05-23 RX ADMIN — Medication 1000 MILLILITER(S): at 11:55

## 2025-05-23 RX ADMIN — Medication 0.5 MILLIGRAM(S): at 19:35

## 2025-05-23 RX ADMIN — Medication 4 MILLIGRAM(S): at 18:16

## 2025-05-23 RX ADMIN — Medication 400 MILLIGRAM(S): at 15:46

## 2025-05-23 RX ADMIN — Medication 0.5 MILLIGRAM(S): at 20:00

## 2025-05-23 NOTE — BRIEF OPERATIVE NOTE - NSICDXBRIEFPROCEDURE_GEN_ALL_CORE_FT
PROCEDURES:  Exam under anesthesia, pelvic 23-May-2025 17:56:32  Georgina Tellez  Laparoscopic right salpingectomy for ectopic pregnancy 23-May-2025 17:56:40  Georgina Tellez  Evacuation, hemoperitoneum 23-May-2025 17:56:43  Georgina Tellez

## 2025-05-23 NOTE — H&P ADULT - ASSESSMENT
A/P: 30y  LMP (?) at possibly presenting with RLQ pain x3-4d with acute worsening today. TVUS demonstrating R adnexal structure 8.5x7.6x5.7cm, possible hematoma, with moderate hemoperitoneum suspicious for ruptured ectopic pregnancy. H/H 11.1/31.4, VS wnl at this time. Abdominal exam with notable tenderness over b/l lower quadrants, R>L, with voluntary guarding, no rebound. [facility] 2362.    -Pt added on to OR emergently for dx LSC, poss RS/LS, poss RSO/LSO, poss laparotomy   -NPO, LR@125  -Pt consented at bedside and questions answered    Seen and d/w Dr. Manuel  D/w Dr. Linda Ghosh PGY3

## 2025-05-23 NOTE — H&P ADULT - ATTENDING COMMENTS
P1 with previous  with BHCG >2000 and no IUP, concern for ruptured ectopic pregnancy  Patient not NPO (ate bananas at 10am), but as this is an emergent procedure, will proceed to OR and not wait for NPO status  Risks of surgery reviewed, consents signed and witnessed    Ziggy Mckeon MD

## 2025-05-23 NOTE — H&P ADULT - HISTORY OF PRESENT ILLNESS
Gyn H&P  AMY CONFIDENT  30y  Female [id number]    HPI: 30y   vs  presenting wit      Name of GYN Physician: Dr. Eugene    OBHx: - pLTCS 2/ NRFHT  mTOP x1, TOP w/ D+C x1  GYNHx: Denies fibroids, cysts, endometriosis, STI's, Abnormal pap smears   PMH: sickle cell trait  PSH: C/S x1, D+C x1  Meds: denies  NKDA    Vital Signs Last 24 Hrs  T(C): 36.9 (23 May 2025 11:30), Max: 36.9 (23 May 2025 11:30)  T(F): 98.4 (23 May 2025 11:30), Max: 98.4 (23 May 2025 11:30)  HR: 89 (23 May 2025 11:30) (89 - 100)  BP: 103/65 (23 May 2025 11:30) (103/65 - 112/75)  BP(mean): --  RR: 15 (23 May 2025 11:30) (14 - 15)  SpO2: 97% (23 May 2025 11:30) (97% - 97%)    Parameters below as of 23 May 2025 11:30  Patient On (Oxygen Delivery Method): room air        Physical Exam:   General: sitting comfortably in bed, NAD   CV: clinically well perfused  Lungs: respiring comfortably on RA  Back: No CVA tenderness  Abd: Soft, non-tender, non-distended.  Bowel sounds present.    : No bleeding on pad. External labia wnl. Uterus wnl, nontender. Adnexa non palpable b/l. No CMT. Cervix closed.   Speculum Exam: No active bleeding from os.  Physiologic discharge.    Ext: non-tender b/l, no edema     LABS:                              11.1   9.94  )-----------( 283      ( 23 May 2025 11:55 )             31.4     05-23    134[L]  |  101  |  12  ----------------------------<  110[H]  3.6   |  22  |  0.63    Ca    8.9      23 May 2025 11:55    TPro  6.8  /  Alb  4.0  /  TBili  0.5  /  DBili  x   /  AST  14  /  ALT  7   /  AlkPhos  38[L]        Urinalysis Basic - ( 23 May 2025 13:00 )    Color: Yellow / Appearance: Clear / S.031 / pH: x  Gluc: x / Ketone: x  / Bili: Negative / Urobili: 1.0 mg/dL   Blood: x / Protein: 30 mg/dL / Nitrite: Positive   Leuk Esterase: Trace / RBC: x / WBC x   Sq Epi: x / Non Sq Epi: x / Bacteria: x        RADIOLOGY & ADDITIONAL STUDIES:  < from: US Transvaginal (25 @ 13:01) >  ACC: [id number] EXAM:  US TRANSVAGINAL   ORDERED BY: REYNA RUIZ     PROCEDURE DATE:  2025          INTERPRETATION:  CLINICAL INFORMATION: Right adnexal pain. Positive   beta-hCG.    LMP: 2025.    COMPARISON: None available.    TECHNIQUE:  Endovaginal pelvic sonogram as per order. Transabdominal pelvic sonogram   was also performed as part of our standard protocol. Color and Spectral   Doppler was performed.    FINDINGS:  Uterus: 7.6 cm x 4.0 cm x 5.0 cm. No discrete endometrial cystic  structures to suggest gestational sac.  Endometrium: 11 mm. Within normal limits.    There is a heterogeneous echogenic mass in the right adnexa measuring 8.5   x 7.6 x 5.7 cm without color Doppler flow. Question hematoma. Embedded   within this hematoma, there is a ill-defined structure measuring 3.1 x   1.5 x 2.9 cm, suggestive of a normal sized right ovary with few   follicles. Normal arterial flow of the right ovary noted.    Left ovary: 1.8 cm x 1.0 cm x 1.3 cm. Within normal limits.    Fluid: Moderate amount of intraperitoneal/pelvic fluid with internal   debris, suggestive of hemoperitoneum.    IMPRESSION:  Findings consistent with pregnancy of unknown location.  Heterogeneous echogenic mass in the right adnexa measuring 8.5 x 7.6 x   5.7 cm without color Doppler flow. Question hematoma. Additional moderate   amount of the retroperitoneal/pelvic fluid with internal debris   suggestive of hemoperitoneum. Ruptured right adnexal ectopic pregnancy is   primary differential.    Findings were discussed with Dr. REYNA RUIZ [phone] 2025 1:16   PM by Dr. Heart with read back confirmation.    --- End of Report ---      < end of copied text >   Gyn H&P  AMY CONFIDENT  30y  Female [id number]    HPI: 30y  LMP 4/16(?) at possibly presenting with RLQ pain x3-4d with acute worsening today. Pt reports pain in her RLQ that radiates to her LLQ, describes pain as "gas pain," denies requiring pain medications. Pt reports vaginal spotting x1d, denies heavy vaginal bleeding or passage of clots. Pt     Of note pt did not know she was pregnant prior to presentation today and reports she has not been sexually active since early April.      [facility] [place] ()    Name of GYN Physician: Dr. Eugene    OBHx: - pLTCS  NRFHT  mTOP x1, TOP w/ D+C x1  GYNHx: Denies fibroids, cysts, endometriosis, STI's, Abnormal pap smears   PMH: sickle cell trait  PSH: C/S x1, D+C x1  Meds: denies  NKDA    Vital Signs Last 24 Hrs  T(C): 36.9 (23 May 2025 11:30), Max: 36.9 (23 May 2025 11:30)  T(F): 98.4 (23 May 2025 11:30), Max: 98.4 (23 May 2025 11:30)  HR: 89 (23 May 2025 11:30) (89 - 100)  BP: 103/65 (23 May 2025 11:30) (103/65 - 112/75)  BP(mean): --  RR: 15 (23 May 2025 11:30) (14 - 15)  SpO2: 97% (23 May 2025 11:30) (97% - 97%)    Parameters below as of 23 May 2025 11:30  Patient On (Oxygen Delivery Method): room air        Physical Exam:   General: sitting comfortably in bed, NAD   CV: clinically well perfused  Lungs: respiring comfortably on RA  Back: No CVA tenderness  Abd: Soft, non-tender, non-distended.  Bowel sounds present.    : No bleeding on pad. External labia wnl. Uterus wnl, nontender. Adnexa non palpable b/l. No CMT. Cervix closed.   Speculum Exam: No active bleeding from os.  Physiologic discharge.    Ext: non-tender b/l, no edema     LABS:                              11.1   9.94  )-----------( 283      ( 23 May 2025 11:55 )             31.4         134[L]  |  101  |  12  ----------------------------<  110[H]  3.6   |  22  |  0.63    Ca    8.9      23 May 2025 11:55    TPro  6.8  /  Alb  4.0  /  TBili  0.5  /  DBili  x   /  AST  14  /  ALT  7   /  AlkPhos  38[L]        Urinalysis Basic - ( 23 May 2025 13:00 )    Color: Yellow / Appearance: Clear / S.031 / pH: x  Gluc: x / Ketone: x  / Bili: Negative / Urobili: 1.0 mg/dL   Blood: x / Protein: 30 mg/dL / Nitrite: Positive   Leuk Esterase: Trace / RBC: x / WBC x   Sq Epi: x / Non Sq Epi: x / Bacteria: x        RADIOLOGY & ADDITIONAL STUDIES:  < from: US Transvaginal (25 @ 13:01) >  ACC: [id number] EXAM:  US TRANSVAGINAL   ORDERED BY: REYNA RUIZ     PROCEDURE DATE:  2025          INTERPRETATION:  CLINICAL INFORMATION: Right adnexal pain. Positive   beta-hCG.    LMP: 2025.    COMPARISON: None available.    TECHNIQUE:  Endovaginal pelvic sonogram as per order. Transabdominal pelvic sonogram   was also performed as part of our standard protocol. Color and Spectral   Doppler was performed.    FINDINGS:  Uterus: 7.6 cm x 4.0 cm x 5.0 cm. No discrete endometrial cystic  structures to suggest gestational sac.  Endometrium: 11 mm. Within normal limits.    There is a heterogeneous echogenic mass in the right adnexa measuring 8.5   x 7.6 x 5.7 cm without color Doppler flow. Question hematoma. Embedded   within this hematoma, there is a ill-defined structure measuring 3.1 x   1.5 x 2.9 cm, suggestive of a normal sized right ovary with few   follicles. Normal arterial flow of the right ovary noted.    Left ovary: 1.8 cm x 1.0 cm x 1.3 cm. Within normal limits.    Fluid: Moderate amount of intraperitoneal/pelvic fluid with internal   debris, suggestive of hemoperitoneum.    IMPRESSION:  Findings consistent with pregnancy of unknown location.  Heterogeneous echogenic mass in the right adnexa measuring 8.5 x 7.6 x   5.7 cm without color Doppler flow. Question hematoma. Additional moderate   amount of the retroperitoneal/pelvic fluid with internal debris   suggestive of hemoperitoneum. Ruptured right adnexal ectopic pregnancy is   primary differential.    Findings were discussed with Dr. REYNA RUIZ [id number] 2025 1:16   PM by Dr. Heart with read back confirmation.    --- End of Report ---      < end of copied text >   Gyn H&P  AMY CONFIDENT  30y  Female [id number]    HPI: 30y  LMP /16(?) at possibly presenting with RLQ pain x3-4d with acute worsening today. Pt reports pain in her RLQ that radiates to her LLQ, describes pain as "gas pain," denies requiring pain medications. Pt reports vaginal spotting x1d, denies heavy vaginal bleeding or passage of clots. Pt denies fevers, chills, n/v, inability to tolerate PO.    Of note pt did not know she was pregnant prior to presentation today and reports she has not been sexually active since early April.      [facility] [place] ()    Name of GYN Physician: Dr. Eugene    OBHx: - pLTCS / NRFHT  mTOP x1, TOP w/ D+C x1  GYNHx: Denies fibroids, cysts, endometriosis, STI's, Abnormal pap smears   PMH: sickle cell trait  PSH: C/S x1, D+C x1  Meds: denies  NKDA    Vital Signs Last 24 Hrs  T(C): 36.9 (23 May 2025 11:30), Max: 36.9 (23 May 2025 11:30)  T(F): 98.4 (23 May 2025 11:30), Max: 98.4 (23 May 2025 11:30)  HR: 89 (23 May 2025 11:30) (89 - 100)  BP: 103/65 (23 May 2025 11:30) (103/65 - 112/75)  BP(mean): --  RR: 15 (23 May 2025 11:30) (14 - 15)  SpO2: 97% (23 May 2025 11:30) (97% - 97%)    Parameters below as of 23 May 2025 11:30  Patient On (Oxygen Delivery Method): room air        Physical Exam:   General: sitting comfortably in bed, NAD   CV: clinically well perfused  Lungs: respiring comfortably on RA  Back: No CVA tenderness  Abd: Soft, non-tender, non-distended.  Bowel sounds present.    : No bleeding on pad. External labia wnl. Uterus wnl, nontender. Adnexa non palpable b/l. No CMT. Cervix closed.   Speculum Exam: No active bleeding from os.  Physiologic discharge.    Ext: non-tender b/l, no edema     LABS:                              11.1   9.94  )-----------( 283      ( 23 May 2025 11:55 )             31.4         134[L]  |  101  |  12  ----------------------------<  110[H]  3.6   |  22  |  0.63    Ca    8.9      23 May 2025 11:55    TPro  6.8  /  Alb  4.0  /  TBili  0.5  /  DBili  x   /  AST  14  /  ALT  7   /  AlkPhos  38[L]        Urinalysis Basic - ( 23 May 2025 13:00 )    Color: Yellow / Appearance: Clear / S.031 / pH: x  Gluc: x / Ketone: x  / Bili: Negative / Urobili: 1.0 mg/dL   Blood: x / Protein: 30 mg/dL / Nitrite: Positive   Leuk Esterase: Trace / RBC: x / WBC x   Sq Epi: x / Non Sq Epi: x / Bacteria: x        RADIOLOGY & ADDITIONAL STUDIES:  < from: US Transvaginal (25 @ 13:01) >  ACC: [id number] EXAM:  US TRANSVAGINAL   ORDERED BY: REYNA RUIZ     PROCEDURE DATE:  2025          INTERPRETATION:  CLINICAL INFORMATION: Right adnexal pain. Positive   beta-hCG.    LMP: 2025.    COMPARISON: None available.    TECHNIQUE:  Endovaginal pelvic sonogram as per order. Transabdominal pelvic sonogram   was also performed as part of our standard protocol. Color and Spectral   Doppler was performed.    FINDINGS:  Uterus: 7.6 cm x 4.0 cm x 5.0 cm. No discrete endometrial cystic  structures to suggest gestational sac.  Endometrium: 11 mm. Within normal limits.    There is a heterogeneous echogenic mass in the right adnexa measuring 8.5   x 7.6 x 5.7 cm without color Doppler flow. Question hematoma. Embedded   within this hematoma, there is a ill-defined structure measuring 3.1 x   1.5 x 2.9 cm, suggestive of a normal sized right ovary with few   follicles. Normal arterial flow of the right ovary noted.    Left ovary: 1.8 cm x 1.0 cm x 1.3 cm. Within normal limits.    Fluid: Moderate amount of intraperitoneal/pelvic fluid with internal   debris, suggestive of hemoperitoneum.    IMPRESSION:  Findings consistent with pregnancy of unknown location.  Heterogeneous echogenic mass in the right adnexa measuring 8.5 x 7.6 x   5.7 cm without color Doppler flow. Question hematoma. Additional moderate   amount of the retroperitoneal/pelvic fluid with internal debris   suggestive of hemoperitoneum. Ruptured right adnexal ectopic pregnancy is   primary differential.    Findings were discussed with Dr. REYNA RUIZ [id number] 2025 1:16   PM by Dr. Heart with read back confirmation.    --- End of Report ---      < end of copied text >   Gyn H&P  AMY CONFIDENT  30y  Female [id number]    HPI: 30y  LMP 4/16(?) at possibly presenting with RLQ pain x3-4d with acute worsening today. Pt reports pain in her RLQ that radiates to her LLQ, describes pain as "gas pain," denies requiring pain medications. Pt reports vaginal spotting x1d, denies heavy vaginal bleeding or passage of clots. Pt denies fevers, chills, n/v, inability to tolerate PO. Of note pt did not know she was pregnant prior to presentation today and reports she has not been sexually active since early April. Pt reports a few days of VB on  which she presumed was a normal menses.    [facility] [place] ()    Name of GYN Physician: Dr. Eugene    OBHx: - pLTCS  NRFHT  mTOP x1, TOP w/ D+C x1  GYNHx: Denies fibroids, cysts, endometriosis, STI's, Abnormal pap smears   PMH: sickle cell trait  PSH: C/S x1, D+C x1  Meds: denies  NKDA    Vital Signs Last 24 Hrs  T(C): 36.9 (23 May 2025 11:30), Max: 36.9 (23 May 2025 11:30)  T(F): 98.4 (23 May 2025 11:30), Max: 98.4 (23 May 2025 11:30)  HR: 89 (23 May 2025 11:30) (89 - 100)  BP: 103/65 (23 May 2025 11:30) (103/65 - 112/75)  BP(mean): --  RR: 15 (23 May 2025 11:30) (14 - 15)  SpO2: 97% (23 May 2025 11:30) (97% - 97%)    Parameters below as of 23 May 2025 11:30  Patient On (Oxygen Delivery Method): room air        Physical Exam:   General: sitting comfortably in bed, NAD   CV: clinically well perfused  Lungs: respiring comfortably on RA  Abd: Soft, non-distended, +tender to palpation over RLQ>LLQ, suprapubic tenderness, +voluntary guarding, no rebound  : Spotting on underlying pad. External labia wnl. Uterus wnl, nontender. R adnexa +TTP. Cervix closed. 5cc dark red blood in vaginal vault.  Speculum Exam: No active bleeding from os.  Physiologic discharge.    Ext: non-tender b/l, no edema     LABS:                              11.1   9.94  )-----------( 283      ( 23 May 2025 11:55 )             31.4         134[L]  |  101  |  12  ----------------------------<  110[H]  3.6   |  22  |  0.63    Ca    8.9      23 May 2025 11:55    TPro  6.8  /  Alb  4.0  /  TBili  0.5  /  DBili  x   /  AST  14  /  ALT  7   /  AlkPhos  38[L]        Urinalysis Basic - ( 23 May 2025 13:00 )    Color: Yellow / Appearance: Clear / S.031 / pH: x  Gluc: x / Ketone: x  / Bili: Negative / Urobili: 1.0 mg/dL   Blood: x / Protein: 30 mg/dL / Nitrite: Positive   Leuk Esterase: Trace / RBC: x / WBC x   Sq Epi: x / Non Sq Epi: x / Bacteria: x        RADIOLOGY & ADDITIONAL STUDIES:  < from: US Transvaginal (25 @ 13:01) >  ACC: [id number] EXAM:  US TRANSVAGINAL   ORDERED BY: REYNA RUIZ     PROCEDURE DATE:  2025          INTERPRETATION:  CLINICAL INFORMATION: Right adnexal pain. Positive   beta-hCG.    LMP: 2025.    COMPARISON: None available.    TECHNIQUE:  Endovaginal pelvic sonogram as per order. Transabdominal pelvic sonogram   was also performed as part of our standard protocol. Color and Spectral   Doppler was performed.    FINDINGS:  Uterus: 7.6 cm x 4.0 cm x 5.0 cm. No discrete endometrial cystic  structures to suggest gestational sac.  Endometrium: 11 mm. Within normal limits.    There is a heterogeneous echogenic mass in the right adnexa measuring 8.5   x 7.6 x 5.7 cm without color Doppler flow. Question hematoma. Embedded   within this hematoma, there is a ill-defined structure measuring 3.1 x   1.5 x 2.9 cm, suggestive of a normal sized right ovary with few   follicles. Normal arterial flow of the right ovary noted.    Left ovary: 1.8 cm x 1.0 cm x 1.3 cm. Within normal limits.    Fluid: Moderate amount of intraperitoneal/pelvic fluid with internal   debris, suggestive of hemoperitoneum.    IMPRESSION:  Findings consistent with pregnancy of unknown location.  Heterogeneous echogenic mass in the right adnexa measuring 8.5 x 7.6 x   5.7 cm without color Doppler flow. Question hematoma. Additional moderate   amount of the retroperitoneal/pelvic fluid with internal debris   suggestive of hemoperitoneum. Ruptured right adnexal ectopic pregnancy is   primary differential.    Findings were discussed with Dr. REYNA RUIZ [id number] 2025 1:16   PM by Dr. Heart with read back confirmation.    --- End of Report ---      < end of copied text >

## 2025-05-23 NOTE — ED PROVIDER NOTE - PHYSICAL EXAMINATION
david  awake alert nad  ncat  lungs clr  heart [id number]  abd soft tender to RLQ with some rebound also tender on the left but less than right  skin nl  ext nl

## 2025-05-23 NOTE — BRIEF OPERATIVE NOTE - OPERATION/FINDINGS
EUA: anteverted 8cm uterus, b/l lower adnexal fullness with firm lower abdomen appreciated.  LSC: atraumatic entry sites, 5cm ruptured ectopic pregnancy within R fallopian tube, 200cc hemoperitoneum noted. Normal appearing uterus, b/l ovaries, L fallopian tube. Unremarkable abdominal survey including liver edge, L hemidiaphragm, bowel, bladder. Good hemostasis noted.

## 2025-05-23 NOTE — ED PROVIDER NOTE - OBJECTIVE STATEMENT
david: 30 year old with complaint of RLQ pain intermittently for three or four days.  It went away two days ago but then came back today  no n/v no fever/chills lmp 5/9/25 lasted 7 days then had some spotting declines pain meds at this time

## 2025-05-23 NOTE — ASU DISCHARGE PLAN (ADULT/PEDIATRIC) - FINANCIAL ASSISTANCE
[facility] provides services at a reduced cost to those who are determined to be eligible through [facility]’s financial assistance program. Information regarding [facility]’s financial assistance program can be found by going to [email or web address] or by calling [phone].

## 2025-05-23 NOTE — ED ADULT NURSE NOTE - NSFALLUNIVINTERV_ED_ALL_ED
Bed/Stretcher in lowest position, wheels locked, appropriate side rails in place/Call bell, personal items and telephone in reach/Instruct patient to call for assistance before getting out of bed/chair/stretcher/Non-slip footwear applied when patient is off stretcher/[place] to call system/Physically safe environment - no spills, clutter or unnecessary equipment/Purposeful proactive rounding/Room/bathroom lighting operational, light cord in reach

## 2025-05-23 NOTE — ED ADULT TRIAGE NOTE - WEIGHT IN KG
62.1 Pt seen and examined  no clots, but sig residual- will maintain sierra for now  IR consult for angiogram to assess for bleeding given clinical picture, and to embolize  will determine course of tube management based on angio/radiology results

## 2025-05-23 NOTE — ED PROVIDER NOTE - CLINICAL SUMMARY MEDICAL DECISION MAKING FREE TEXT BOX
30 year old with RLQ and LLQ pain for several days  concern for appendicitis/TOA/diverticulitis  will get US to evlauate for TOA or Ovarian cyst/torsion will get ct to r/o appendicitis will get beta hcg to r/o pregnancy will get cbc and cmp to assess for infection or any electrolyte abn.  wlll get ua to r/o uti

## 2025-05-23 NOTE — ASU DISCHARGE PLAN (ADULT/PEDIATRIC) - CARE PROVIDER_API CALL
Ziggy Mckeon  Obstetrics and Gynecology  [address]  [place] [address]  Phone: [phone]  Fax: [phone]  Follow Up Time: 2 weeks

## 2025-05-23 NOTE — ASU DISCHARGE PLAN (ADULT/PEDIATRIC) - PROCEDURE
Pelvic exam under anesthesia, laparoscopic right salpingectomy for ruptured ectopic pregnancy, evacuation of hemoperitoneum

## 2025-05-23 NOTE — ED ADULT NURSE NOTE - OBJECTIVE STATEMENT
30 year old female c/o 6/10, intermittent, nonreproducible, "gas pain" x 3 days. Pt states they feel like they have to go to the bathroom but cannot. Pt endorses intermittent episodes of lightheadedness. Pt states no alleviating factors. Pt endorses LMP was 5/9, however is experiencing vaginal spotting at this time. Pt denies any chest pain, SOB, palpitations, dizziness, weakness, headache, N/V/D, constipation, or urinary symptoms at this time. AxOx4. RR even and unlabored on room air. S1 and S2 noted, rate and rhythm regular. Abdomen soft, nondistended and (-) TTP in all quadrants. Normoactive bowel sounds heard in all 4 quadrants. Safety maintained.

## 2025-05-23 NOTE — ED ADULT TRIAGE NOTE - CHIEF COMPLAINT QUOTE
c/o lower abdominal pain X 2 days. reports pain started on the right side, now pain is on both sides. reports had vaginal spotting this morning. LMP 5/9/25. denies N/V/D. Phx sickle cell trait

## 2025-05-27 LAB
-  AMOXICILLIN/CLAVULANIC ACID: SIGNIFICANT CHANGE UP
-  AMPICILLIN/SULBACTAM: SIGNIFICANT CHANGE UP
-  AMPICILLIN: SIGNIFICANT CHANGE UP
-  AZTREONAM: SIGNIFICANT CHANGE UP
-  CEFAZOLIN: SIGNIFICANT CHANGE UP
-  CEFEPIME: SIGNIFICANT CHANGE UP
-  CEFOXITIN: SIGNIFICANT CHANGE UP
-  CEFTRIAXONE: SIGNIFICANT CHANGE UP
-  CEFUROXIME: SIGNIFICANT CHANGE UP
-  CIPROFLOXACIN: SIGNIFICANT CHANGE UP
-  ERTAPENEM: SIGNIFICANT CHANGE UP
-  GENTAMICIN: SIGNIFICANT CHANGE UP
-  IMIPENEM: SIGNIFICANT CHANGE UP
-  LEVOFLOXACIN: SIGNIFICANT CHANGE UP
-  MEROPENEM: SIGNIFICANT CHANGE UP
-  NITROFURANTOIN: SIGNIFICANT CHANGE UP
-  PIPERACILLIN/TAZOBACTAM: SIGNIFICANT CHANGE UP
-  TIGECYCLINE: SIGNIFICANT CHANGE UP
-  TOBRAMYCIN: SIGNIFICANT CHANGE UP
-  TRIMETHOPRIM/SULFAMETHOXAZOLE: SIGNIFICANT CHANGE UP
CULTURE RESULTS: ABNORMAL
METHOD TYPE: SIGNIFICANT CHANGE UP
ORGANISM # SPEC MICROSCOPIC CNT: ABNORMAL
ORGANISM # SPEC MICROSCOPIC CNT: ABNORMAL
SPECIMEN SOURCE: SIGNIFICANT CHANGE UP

## 2025-05-28 LAB — SURGICAL PATHOLOGY STUDY: SIGNIFICANT CHANGE UP

## 2025-06-04 LAB
CULTURE RESULTS: SIGNIFICANT CHANGE UP
SPECIMEN SOURCE: SIGNIFICANT CHANGE UP

## 2025-06-13 ENCOUNTER — RX RENEWAL (OUTPATIENT)
Age: 30
End: 2025-06-13

## 2025-06-18 ENCOUNTER — APPOINTMENT (OUTPATIENT)
Dept: OBGYN | Facility: CLINIC | Age: 30
End: 2025-06-18

## 2025-06-18 VITALS
WEIGHT: 135 LBS | DIASTOLIC BLOOD PRESSURE: 68 MMHG | HEIGHT: 64 IN | SYSTOLIC BLOOD PRESSURE: 109 MMHG | HEART RATE: 61 BPM | BODY MASS INDEX: 23.05 KG/M2

## 2025-06-18 PROCEDURE — 99024 POSTOP FOLLOW-UP VISIT: CPT

## 2025-06-18 RX ORDER — LEVONORGESTREL AND ETHINYL ESTRADIOL 0.1-0.02MG
0.1-2 KIT ORAL
Qty: 84 | Refills: 1 | Status: ACTIVE | COMMUNITY
Start: 2025-06-18 | End: 1900-01-01

## (undated) DEVICE — TROCAR COVIDIEN VERSAONE FIXATION CANNULA 5MM

## (undated) DEVICE — UTERINE MANIPULATOR CLINICAL INNOVATIONS CLEARVIEW 7CM

## (undated) DEVICE — FOLEY TRAY 16FR LF URINE METER SURESTEP

## (undated) DEVICE — VENODYNE/SCD SLEEVE CALF MEDIUM

## (undated) DEVICE — PACK PERI GYN

## (undated) DEVICE — PACK D&C

## (undated) DEVICE — PACK GENERAL LAPAROSCOPY

## (undated) DEVICE — POSITIONER STRAP ARMBOARD VELCRO TS-30

## (undated) DEVICE — UTERINE MANIPULATOR COOPER SURGICAL 5MM 33CM GREEN

## (undated) DEVICE — SOL IRR POUR NS 0.9% 500ML

## (undated) DEVICE — DRSG TEGADERM 2.5 X 3"

## (undated) DEVICE — TROCAR COVIDIEN BLUNT TIP HASSAN 10MM

## (undated) DEVICE — DRSG PAD SANITARY OB

## (undated) DEVICE — LIGASURE BLUNT TIP 5MM X 37CM

## (undated) DEVICE — INSUFFLATION NDL COVIDIEN SURGINEEDLE VERESS 120MM

## (undated) DEVICE — SUT MONOCRYL 4-0 27" PS-2 UNDYED

## (undated) DEVICE — POSITIONER PINK PAD PIGAZZI SYSTEM

## (undated) DEVICE — WARMING BLANKET FULL ADULT

## (undated) DEVICE — ELCTR BOVIE TIP BLADE INSULATED 2.75" EDGE

## (undated) DEVICE — DRAPE WARMING SOLUTION 44 X 44"

## (undated) DEVICE — SYR LUER LOK 10CC

## (undated) DEVICE — DRSG MASTISOL

## (undated) DEVICE — BLADE SURGICAL #15 CARBON

## (undated) DEVICE — DRAPE LIGHT HANDLE COVER (GREEN)

## (undated) DEVICE — TUBING OLYMPUS INSUFFLATION

## (undated) DEVICE — GLV 7.5 PROTEXIS (CREAM) MICRO

## (undated) DEVICE — ELCTR BOVIE PENCIL SMOKE EVACUATION

## (undated) DEVICE — NDL HYPO REGULAR BEVEL 25G X 1.5" (BLUE)

## (undated) DEVICE — SOL IRR POUR H2O 500ML

## (undated) DEVICE — TROCAR COVIDIEN VERSAPORT BLADELESS OPTICAL 5MM STANDARD

## (undated) DEVICE — SUT VICRYL 0 27" UR-6

## (undated) DEVICE — POSITIONER PURPLE ARM ONE STEP (LARGE)

## (undated) DEVICE — DRAPE TOWEL BLUE 17" X 24"

## (undated) DEVICE — BASIN SET SINGLE

## (undated) DEVICE — GLV 7 PROTEXIS (WHITE)

## (undated) DEVICE — PREP BETADINE KIT